# Patient Record
Sex: MALE | Race: BLACK OR AFRICAN AMERICAN | NOT HISPANIC OR LATINO
[De-identification: names, ages, dates, MRNs, and addresses within clinical notes are randomized per-mention and may not be internally consistent; named-entity substitution may affect disease eponyms.]

---

## 2017-03-23 ENCOUNTER — MEDICATION RENEWAL (OUTPATIENT)
Age: 67
End: 2017-03-23

## 2017-05-26 ENCOUNTER — APPOINTMENT (OUTPATIENT)
Dept: CARDIOLOGY | Facility: CLINIC | Age: 67
End: 2017-05-26

## 2017-05-26 ENCOUNTER — NON-APPOINTMENT (OUTPATIENT)
Age: 67
End: 2017-05-26

## 2017-05-26 VITALS
DIASTOLIC BLOOD PRESSURE: 77 MMHG | OXYGEN SATURATION: 99 % | BODY MASS INDEX: 28.51 KG/M2 | WEIGHT: 182 LBS | SYSTOLIC BLOOD PRESSURE: 140 MMHG | HEART RATE: 73 BPM

## 2017-05-26 VITALS — DIASTOLIC BLOOD PRESSURE: 72 MMHG | SYSTOLIC BLOOD PRESSURE: 122 MMHG

## 2017-05-26 DIAGNOSIS — N40.0 BENIGN PROSTATIC HYPERPLASIA WITHOUT LOWER URINARY TRACT SYMPMS: ICD-10-CM

## 2017-05-26 DIAGNOSIS — R07.89 OTHER CHEST PAIN: ICD-10-CM

## 2017-05-26 DIAGNOSIS — I73.9 PERIPHERAL VASCULAR DISEASE, UNSPECIFIED: ICD-10-CM

## 2017-09-28 ENCOUNTER — APPOINTMENT (OUTPATIENT)
Dept: CARDIOLOGY | Facility: CLINIC | Age: 67
End: 2017-09-28
Payer: COMMERCIAL

## 2017-09-28 PROCEDURE — 78452 HT MUSCLE IMAGE SPECT MULT: CPT

## 2017-09-28 PROCEDURE — A9500: CPT

## 2017-09-28 PROCEDURE — 93015 CV STRESS TEST SUPVJ I&R: CPT

## 2017-09-29 ENCOUNTER — APPOINTMENT (OUTPATIENT)
Dept: CARDIOLOGY | Facility: CLINIC | Age: 67
End: 2017-09-29
Payer: COMMERCIAL

## 2017-09-29 ENCOUNTER — MEDICATION RENEWAL (OUTPATIENT)
Age: 67
End: 2017-09-29

## 2017-09-29 PROCEDURE — 93306 TTE W/DOPPLER COMPLETE: CPT

## 2017-10-23 ENCOUNTER — MEDICATION RENEWAL (OUTPATIENT)
Age: 67
End: 2017-10-23

## 2017-11-20 ENCOUNTER — APPOINTMENT (OUTPATIENT)
Dept: ELECTROPHYSIOLOGY | Facility: CLINIC | Age: 67
End: 2017-11-20
Payer: COMMERCIAL

## 2017-11-20 ENCOUNTER — NON-APPOINTMENT (OUTPATIENT)
Age: 67
End: 2017-11-20

## 2017-11-20 VITALS
WEIGHT: 175 LBS | OXYGEN SATURATION: 98 % | DIASTOLIC BLOOD PRESSURE: 60 MMHG | HEART RATE: 69 BPM | HEIGHT: 67 IN | SYSTOLIC BLOOD PRESSURE: 102 MMHG | BODY MASS INDEX: 27.47 KG/M2

## 2017-11-20 VITALS — SYSTOLIC BLOOD PRESSURE: 108 MMHG | DIASTOLIC BLOOD PRESSURE: 56 MMHG

## 2017-11-20 PROCEDURE — 99244 OFF/OP CNSLTJ NEW/EST MOD 40: CPT

## 2017-11-20 PROCEDURE — 93000 ELECTROCARDIOGRAM COMPLETE: CPT

## 2018-01-05 ENCOUNTER — APPOINTMENT (OUTPATIENT)
Dept: CARDIOLOGY | Facility: CLINIC | Age: 68
End: 2018-01-05
Payer: COMMERCIAL

## 2018-01-05 ENCOUNTER — NON-APPOINTMENT (OUTPATIENT)
Age: 68
End: 2018-01-05

## 2018-01-05 VITALS
OXYGEN SATURATION: 98 % | DIASTOLIC BLOOD PRESSURE: 60 MMHG | HEART RATE: 42 BPM | SYSTOLIC BLOOD PRESSURE: 120 MMHG | BODY MASS INDEX: 28.51 KG/M2 | WEIGHT: 182 LBS

## 2018-01-05 VITALS — DIASTOLIC BLOOD PRESSURE: 60 MMHG | SYSTOLIC BLOOD PRESSURE: 120 MMHG | BODY MASS INDEX: 28.51 KG/M2 | WEIGHT: 182 LBS

## 2018-01-05 DIAGNOSIS — R63.4 ABNORMAL WEIGHT LOSS: ICD-10-CM

## 2018-01-05 DIAGNOSIS — M17.12 UNILATERAL PRIMARY OSTEOARTHRITIS, LEFT KNEE: ICD-10-CM

## 2018-01-05 PROCEDURE — 99214 OFFICE O/P EST MOD 30 MIN: CPT

## 2018-01-05 PROCEDURE — 93000 ELECTROCARDIOGRAM COMPLETE: CPT

## 2018-01-05 PROCEDURE — 93880 EXTRACRANIAL BILAT STUDY: CPT

## 2018-01-05 RX ORDER — SILODOSIN 8 MG/1
8 CAPSULE ORAL DAILY
Qty: 90 | Refills: 1 | Status: DISCONTINUED | COMMUNITY
End: 2018-01-05

## 2018-07-20 ENCOUNTER — NON-APPOINTMENT (OUTPATIENT)
Age: 68
End: 2018-07-20

## 2018-07-20 ENCOUNTER — APPOINTMENT (OUTPATIENT)
Dept: CARDIOLOGY | Facility: CLINIC | Age: 68
End: 2018-07-20
Payer: COMMERCIAL

## 2018-07-20 VITALS
SYSTOLIC BLOOD PRESSURE: 137 MMHG | DIASTOLIC BLOOD PRESSURE: 63 MMHG | HEIGHT: 67 IN | OXYGEN SATURATION: 100 % | HEART RATE: 81 BPM | WEIGHT: 175 LBS | BODY MASS INDEX: 27.47 KG/M2

## 2018-07-20 PROCEDURE — 93000 ELECTROCARDIOGRAM COMPLETE: CPT

## 2018-07-20 PROCEDURE — 99214 OFFICE O/P EST MOD 30 MIN: CPT

## 2018-09-20 ENCOUNTER — NON-APPOINTMENT (OUTPATIENT)
Age: 68
End: 2018-09-20

## 2018-09-20 ENCOUNTER — APPOINTMENT (OUTPATIENT)
Dept: CARDIOLOGY | Facility: CLINIC | Age: 68
End: 2018-09-20
Payer: COMMERCIAL

## 2018-09-20 VITALS
OXYGEN SATURATION: 97 % | BODY MASS INDEX: 28.25 KG/M2 | WEIGHT: 180 LBS | SYSTOLIC BLOOD PRESSURE: 129 MMHG | HEIGHT: 67 IN | DIASTOLIC BLOOD PRESSURE: 66 MMHG

## 2018-09-20 DIAGNOSIS — Z01.810 ENCOUNTER FOR PREPROCEDURAL CARDIOVASCULAR EXAMINATION: ICD-10-CM

## 2018-09-20 PROCEDURE — 99214 OFFICE O/P EST MOD 30 MIN: CPT | Mod: 25

## 2018-09-20 PROCEDURE — 93000 ELECTROCARDIOGRAM COMPLETE: CPT

## 2019-01-09 ENCOUNTER — RX RENEWAL (OUTPATIENT)
Age: 69
End: 2019-01-09

## 2019-01-31 ENCOUNTER — APPOINTMENT (OUTPATIENT)
Dept: CARDIOLOGY | Facility: CLINIC | Age: 69
End: 2019-01-31
Payer: COMMERCIAL

## 2019-01-31 PROCEDURE — 93306 TTE W/DOPPLER COMPLETE: CPT

## 2019-02-01 ENCOUNTER — APPOINTMENT (OUTPATIENT)
Dept: CARDIOLOGY | Facility: CLINIC | Age: 69
End: 2019-02-01
Payer: COMMERCIAL

## 2019-02-01 ENCOUNTER — NON-APPOINTMENT (OUTPATIENT)
Age: 69
End: 2019-02-01

## 2019-02-01 VITALS
DIASTOLIC BLOOD PRESSURE: 66 MMHG | OXYGEN SATURATION: 100 % | WEIGHT: 183 LBS | HEIGHT: 67 IN | BODY MASS INDEX: 28.72 KG/M2 | SYSTOLIC BLOOD PRESSURE: 133 MMHG | HEART RATE: 80 BPM

## 2019-02-01 VITALS — SYSTOLIC BLOOD PRESSURE: 130 MMHG | DIASTOLIC BLOOD PRESSURE: 78 MMHG

## 2019-02-01 DIAGNOSIS — R39.11 BENIGN PROSTATIC HYPERPLASIA WITH LOWER URINARY TRACT SYMPMS: ICD-10-CM

## 2019-02-01 DIAGNOSIS — N40.1 BENIGN PROSTATIC HYPERPLASIA WITH LOWER URINARY TRACT SYMPMS: ICD-10-CM

## 2019-02-01 PROCEDURE — 93000 ELECTROCARDIOGRAM COMPLETE: CPT

## 2019-02-01 PROCEDURE — 99215 OFFICE O/P EST HI 40 MIN: CPT

## 2019-02-05 NOTE — CARDIOLOGY SUMMARY
[___] : [unfilled] [LVEF ___%] : LVEF [unfilled]% [Severe] : severe LV dysfunction [Mild] : mild mitral regurgitation

## 2019-02-05 NOTE — PHYSICAL EXAM
[General Appearance - Well Developed] : well developed [Normal Appearance] : normal appearance [Well Groomed] : well groomed [General Appearance - Well Nourished] : well nourished [General Appearance - In No Acute Distress] : no acute distress [Normal Conjunctiva] : the conjunctiva exhibited no abnormalities [No Oral Pallor] : no oral pallor [No Oral Cyanosis] : no oral cyanosis [Normal Jugular Venous V Waves Present] : normal jugular venous V waves present [No Jugular Venous Frye A Waves] : no jugular venous frye A waves [Respiration, Rhythm And Depth] : normal respiratory rhythm and effort [Exaggerated Use Of Accessory Muscles For Inspiration] : no accessory muscle use [Auscultation Breath Sounds / Voice Sounds] : lungs were clear to auscultation bilaterally [Heart Rate And Rhythm] : heart rate and rhythm were normal [Heart Sounds] : normal S1 and S2 [Murmurs] : no murmurs present [Arterial Pulses Normal] : the arterial pulses were normal [Edema] : no peripheral edema present [Abdomen Soft] : soft [Abdomen Tenderness] : non-tender [Abnormal Walk] : normal gait [Nail Clubbing] : no clubbing of the fingernails [Cyanosis, Localized] : no localized cyanosis [Skin Turgor] : normal skin turgor [] : no rash [Oriented To Time, Place, And Person] : oriented to person, place, and time [Impaired Insight] : insight and judgment were intact [Affect] : the affect was normal [Memory Recent] : recent memory was not impaired [FreeTextEntry1] : left knee in a brace

## 2019-02-11 ENCOUNTER — OUTPATIENT (OUTPATIENT)
Dept: OUTPATIENT SERVICES | Facility: HOSPITAL | Age: 69
LOS: 1 days | Discharge: HOME | End: 2019-02-11
Payer: COMMERCIAL

## 2019-02-11 ENCOUNTER — TRANSCRIPTION ENCOUNTER (OUTPATIENT)
Age: 69
End: 2019-02-11

## 2019-02-11 VITALS
RESPIRATION RATE: 20 BRPM | SYSTOLIC BLOOD PRESSURE: 129 MMHG | TEMPERATURE: 98 F | OXYGEN SATURATION: 100 % | HEART RATE: 80 BPM | DIASTOLIC BLOOD PRESSURE: 75 MMHG

## 2019-02-11 VITALS
OXYGEN SATURATION: 97 % | RESPIRATION RATE: 15 BRPM | HEART RATE: 68 BPM | DIASTOLIC BLOOD PRESSURE: 77 MMHG | SYSTOLIC BLOOD PRESSURE: 148 MMHG

## 2019-02-11 DIAGNOSIS — I42.9 CARDIOMYOPATHY, UNSPECIFIED: ICD-10-CM

## 2019-02-11 DIAGNOSIS — Z78.9 OTHER SPECIFIED HEALTH STATUS: Chronic | ICD-10-CM

## 2019-02-11 LAB
HCT VFR BLD CALC: 45.4 % — SIGNIFICANT CHANGE UP (ref 42–52)
HGB BLD-MCNC: 15.1 G/DL — SIGNIFICANT CHANGE UP (ref 14–18)
MCHC RBC-ENTMCNC: 32.1 PG — HIGH (ref 27–31)
MCHC RBC-ENTMCNC: 33.3 G/DL — SIGNIFICANT CHANGE UP (ref 32–36)
MCV RBC AUTO: 96.6 FL — HIGH (ref 80–94)
PLATELET # BLD AUTO: 153 K/UL — SIGNIFICANT CHANGE UP (ref 150–400)
RBC # BLD: 4.7 M/UL — SIGNIFICANT CHANGE UP (ref 4.6–6.2)
RBC # FLD: 13 % — SIGNIFICANT CHANGE UP (ref 11–15.6)
WBC # BLD: 3.8 K/UL — LOW (ref 4.8–10.8)
WBC # FLD AUTO: 3.8 K/UL — LOW (ref 4.8–10.8)

## 2019-02-11 PROCEDURE — 76937 US GUIDE VASCULAR ACCESS: CPT

## 2019-02-11 PROCEDURE — 85730 THROMBOPLASTIN TIME PARTIAL: CPT

## 2019-02-11 PROCEDURE — 85027 COMPLETE CBC AUTOMATED: CPT

## 2019-02-11 PROCEDURE — 99152 MOD SED SAME PHYS/QHP 5/>YRS: CPT

## 2019-02-11 PROCEDURE — 76937 US GUIDE VASCULAR ACCESS: CPT | Mod: 26

## 2019-02-11 PROCEDURE — C1769: CPT

## 2019-02-11 PROCEDURE — C1889: CPT

## 2019-02-11 PROCEDURE — 93005 ELECTROCARDIOGRAM TRACING: CPT

## 2019-02-11 PROCEDURE — 85610 PROTHROMBIN TIME: CPT

## 2019-02-11 PROCEDURE — 84484 ASSAY OF TROPONIN QUANT: CPT

## 2019-02-11 PROCEDURE — 80048 BASIC METABOLIC PNL TOTAL CA: CPT

## 2019-02-11 PROCEDURE — 36415 COLL VENOUS BLD VENIPUNCTURE: CPT

## 2019-02-11 PROCEDURE — 93460 R&L HRT ART/VENTRICLE ANGIO: CPT | Mod: 26

## 2019-02-11 PROCEDURE — 99153 MOD SED SAME PHYS/QHP EA: CPT

## 2019-02-11 PROCEDURE — 93460 R&L HRT ART/VENTRICLE ANGIO: CPT

## 2019-02-11 PROCEDURE — 93010 ELECTROCARDIOGRAM REPORT: CPT

## 2019-02-11 PROCEDURE — C1894: CPT

## 2019-02-11 PROCEDURE — C1887: CPT

## 2019-02-11 RX ORDER — ASPIRIN/CALCIUM CARB/MAGNESIUM 324 MG
0 TABLET ORAL
Qty: 0 | Refills: 0 | COMMUNITY

## 2019-02-11 NOTE — H&P PST ADULT - ASSESSMENT
68 year old male with prior cardiac stent with recent echo revealed 20-25%.  For LHC to assess coronary arteries    Bleeding risk= 68 year old male with prior cardiac stent with recent echo revealed 20-25%.  For LHC to assess coronary arteries    Bleeding risk=0.8%

## 2019-02-11 NOTE — DISCHARGE NOTE ADULT - PATIENT PORTAL LINK FT
You can access the [facility] Patient Portal, offered by [facility], by registering with the following website: [email or web address]

## 2019-02-11 NOTE — H&P PST ADULT - HISTORY OF PRESENT ILLNESS
68 year old male 68 year old male with prior MI and cardiac stent who went for routine cardiology 68 year old male with prior MI and cardiac stent who went for routine cardiology follow up and was sent for echo which revealed decrease in EF to 20-25%

## 2019-02-11 NOTE — DISCHARGE NOTE ADULT - HOSPITAL COURSE
68 year old with recent drop in EF.  Now s/p LHC which revealed patent stent and normal coronaries.  Wedge 20.  Discharge to home.

## 2019-02-11 NOTE — DISCHARGE NOTE ADULT - CARE PROVIDER_API CALL
Charles Justin)  Nuclear Cardiology  [address] [facility], [address] [place] [address]  Phone: [phone]  Fax: [phone]  Follow Up Time:

## 2019-02-11 NOTE — DISCHARGE NOTE ADULT - CARE PLAN
Principal Discharge DX:	Cardiomyopathy  Goal:	optimal cardiac function  Assessment and plan of treatment:	No heavy lifting, driving, sex, tub baths, swimming, or any activity that submerges the lower half of the body in water for 48 hours.  Limited walking and stairs for 48 hours.    Change the bandaid after 24 hours and every 24 hours after that.  Keep the puncture site dry and covered with a bandaid until a scab forms.    Observe the site frequently.  If bleeding or a large lump (the size of a golf ball or bigger) occurs lie flat, apply continuous direct pressure just above the puncture site for at least 10 minutes, and notify your physician immediately.  If the bleeding cannot be controlled, call 911 immediately for assistance.  Notify your physician of pain, swelling or any drainage.    Notify your physician immediately if coldness, numbness, discoloration or pain in your foot occurs.  Restricted use with no heavy lifting of affected arm for 48 hours.  No submerging the arm in water for 48 hours.  You may start showering today.  Call your doctor for any bleeding, swelling, loss of sensation in the hand or fingers, or fingers turning blue.  If heavy bleeding or large lumps form, hold pressure at the spot and come to the Emergency Room.

## 2019-02-11 NOTE — H&P PST ADULT - PMH
Arthritis    BPH (benign prostatic hyperplasia)    CAD (coronary artery disease)    Cardiomyopathy    HTN (hypertension)    Hyperlipemia    MI, old    Nonischemic cardiomyopathy    PAD (peripheral artery disease)

## 2019-02-11 NOTE — DISCHARGE NOTE ADULT - PLAN OF CARE
optimal cardiac function No heavy lifting, driving, sex, tub baths, swimming, or any activity that submerges the lower half of the body in water for 48 hours.  Limited walking and stairs for 48 hours.    Change the bandaid after 24 hours and every 24 hours after that.  Keep the puncture site dry and covered with a bandaid until a scab forms.    Observe the site frequently.  If bleeding or a large lump (the size of a golf ball or bigger) occurs lie flat, apply continuous direct pressure just above the puncture site for at least 10 minutes, and notify your physician immediately.  If the bleeding cannot be controlled, call 911 immediately for assistance.  Notify your physician of pain, swelling or any drainage.    Notify your physician immediately if coldness, numbness, discoloration or pain in your foot occurs.  Restricted use with no heavy lifting of affected arm for 48 hours.  No submerging the arm in water for 48 hours.  You may start showering today.  Call your doctor for any bleeding, swelling, loss of sensation in the hand or fingers, or fingers turning blue.  If heavy bleeding or large lumps form, hold pressure at the spot and come to the Emergency Room.

## 2019-02-11 NOTE — DISCHARGE NOTE ADULT - MEDICATION SUMMARY - MEDICATIONS TO TAKE
I will START or STAY ON the medications listed below when I get home from the hospital:    aspirin 81 mg oral tablet, chewable  -- 1 tab(s) by mouth once a day  -- Indication: For Antiplatelet    tamsulosin 0.4 mg oral capsule  -- 1 cap(s) by mouth once a day  -- Indication: For bladder    atorvastatin 10 mg oral tablet  -- 1 tab(s) by mouth once a day   -- Indication: For hyperlipidemia    hydrochlorothiazide-valsartan 12.5 mg-320 mg oral tablet  -- 1 tab(s) by mouth once a day  -- Indication: For heart    valsartan-hydrochlorothiazide 320mg-12.5mg oral tablet  -- 1 tab(s) by mouth once a day  -- Indication: For heart    clopidogrel 75 mg oral tablet  -- 1 tab(s) by mouth once a day  -- Indication: For Antiplatelet    Coreg CR 10 mg oral capsule, extended release  -- 1 cap(s) by mouth once a day (in the morning)  -- Indication: For heart    oxybutynin 15 mg/24 hr oral tablet, extended release  -- 1 tab(s) by mouth once a day  -- Indication: For bladder    Vitamin D3 2000 intl units oral capsule  -- 1 cap(s) by mouth once a day  -- Indication: For supplement

## 2019-02-14 DIAGNOSIS — R94.39 ABNORMAL RESULT OF OTHER CARDIOVASCULAR FUNCTION STUDY: ICD-10-CM

## 2019-02-15 ENCOUNTER — OTHER (OUTPATIENT)
Age: 69
End: 2019-02-15

## 2019-02-20 PROBLEM — M19.90 UNSPECIFIED OSTEOARTHRITIS, UNSPECIFIED SITE: Chronic | Status: ACTIVE | Noted: 2019-02-11

## 2019-02-20 PROBLEM — I42.9 CARDIOMYOPATHY, UNSPECIFIED: Chronic | Status: ACTIVE | Noted: 2019-02-11

## 2019-04-17 ENCOUNTER — MEDICATION RENEWAL (OUTPATIENT)
Age: 69
End: 2019-04-17

## 2019-05-10 ENCOUNTER — APPOINTMENT (OUTPATIENT)
Dept: CARDIOLOGY | Facility: CLINIC | Age: 69
End: 2019-05-10
Payer: COMMERCIAL

## 2019-05-10 PROCEDURE — 93306 TTE W/DOPPLER COMPLETE: CPT

## 2019-05-17 ENCOUNTER — MESSAGE (OUTPATIENT)
Age: 69
End: 2019-05-17

## 2019-05-30 ENCOUNTER — APPOINTMENT (OUTPATIENT)
Dept: CARDIOLOGY | Facility: CLINIC | Age: 69
End: 2019-05-30
Payer: COMMERCIAL

## 2019-05-30 VITALS — HEART RATE: 72 BPM

## 2019-05-30 VITALS
HEIGHT: 67 IN | BODY MASS INDEX: 28.72 KG/M2 | HEART RATE: 38 BPM | SYSTOLIC BLOOD PRESSURE: 124 MMHG | WEIGHT: 183 LBS | DIASTOLIC BLOOD PRESSURE: 63 MMHG | OXYGEN SATURATION: 100 %

## 2019-05-30 PROCEDURE — 93000 ELECTROCARDIOGRAM COMPLETE: CPT

## 2019-05-30 PROCEDURE — 99214 OFFICE O/P EST MOD 30 MIN: CPT | Mod: 25

## 2019-06-11 RX ORDER — OXYBUTYNIN CHLORIDE 15 MG/1
15 TABLET, EXTENDED RELEASE ORAL
Refills: 0 | Status: COMPLETED | COMMUNITY
Start: 2018-12-13 | End: 2019-06-11

## 2019-06-25 ENCOUNTER — APPOINTMENT (OUTPATIENT)
Dept: CARDIOLOGY | Facility: CLINIC | Age: 69
End: 2019-06-25
Payer: COMMERCIAL

## 2019-06-25 VITALS — SYSTOLIC BLOOD PRESSURE: 138 MMHG | DIASTOLIC BLOOD PRESSURE: 70 MMHG | HEART RATE: 70 BPM

## 2019-06-25 VITALS
BODY MASS INDEX: 27.78 KG/M2 | WEIGHT: 177 LBS | RESPIRATION RATE: 16 BRPM | DIASTOLIC BLOOD PRESSURE: 87 MMHG | HEART RATE: 60 BPM | SYSTOLIC BLOOD PRESSURE: 162 MMHG | HEIGHT: 67 IN | OXYGEN SATURATION: 98 %

## 2019-06-25 VITALS — DIASTOLIC BLOOD PRESSURE: 80 MMHG | SYSTOLIC BLOOD PRESSURE: 146 MMHG

## 2019-06-25 PROCEDURE — 99214 OFFICE O/P EST MOD 30 MIN: CPT

## 2019-09-03 ENCOUNTER — MEDICATION RENEWAL (OUTPATIENT)
Age: 69
End: 2019-09-03

## 2019-09-03 RX ORDER — ASPIRIN 81 MG
81 TABLET, DELAYED RELEASE (ENTERIC COATED) ORAL DAILY
Qty: 90 | Refills: 3 | Status: DISCONTINUED | COMMUNITY
End: 2019-09-03

## 2019-10-18 ENCOUNTER — APPOINTMENT (OUTPATIENT)
Dept: CARDIOLOGY | Facility: CLINIC | Age: 69
End: 2019-10-18
Payer: COMMERCIAL

## 2019-10-18 ENCOUNTER — NON-APPOINTMENT (OUTPATIENT)
Age: 69
End: 2019-10-18

## 2019-10-18 VITALS
RESPIRATION RATE: 12 BRPM | HEIGHT: 66 IN | OXYGEN SATURATION: 100 % | BODY MASS INDEX: 28.12 KG/M2 | DIASTOLIC BLOOD PRESSURE: 70 MMHG | HEART RATE: 64 BPM | WEIGHT: 175 LBS | SYSTOLIC BLOOD PRESSURE: 124 MMHG

## 2019-10-18 VITALS — SYSTOLIC BLOOD PRESSURE: 138 MMHG | DIASTOLIC BLOOD PRESSURE: 78 MMHG

## 2019-10-18 PROCEDURE — 93306 TTE W/DOPPLER COMPLETE: CPT

## 2019-10-18 PROCEDURE — 93000 ELECTROCARDIOGRAM COMPLETE: CPT

## 2019-10-18 PROCEDURE — 99214 OFFICE O/P EST MOD 30 MIN: CPT

## 2019-10-18 NOTE — CARDIOLOGY SUMMARY
[___] : [unfilled] [LVEF ___%] : LVEF [unfilled]% [Severe] : severe LV dysfunction [None] : no pulmonary hypertension [Enlarged] : enlarged LA size

## 2019-10-22 ENCOUNTER — APPOINTMENT (OUTPATIENT)
Dept: ELECTROPHYSIOLOGY | Facility: CLINIC | Age: 69
End: 2019-10-22

## 2019-10-22 NOTE — DISCUSSION/SUMMARY
[FreeTextEntry1] : In summary, this is a 69 year old male with hyperlipidemia, peripheral arterial disease (s/p stents), hypertension and coronary artery disease (with MI s/p PCI) with cardiomyopathy (suspected mixed ischemic and non-ischemic) and NYHA __ HFrEF (LVEF: 19% 10/18/19) who is referred by Dr. Justin in consultation for consideration of cardiac resynchornization therapy.\par \par RBBB with QRS dur of ~150 msec. IIa indiciation for CRT-D. ?consider His.\par \par INCOMPLETE NOTE - PATIENT DID NOT SHOW FOR VISIT.\par

## 2019-10-22 NOTE — HISTORY OF PRESENT ILLNESS
[FreeTextEntry1] : Sanju Owens is a 69 year old male with hyperlipidemia, peripheral arterial disease (s/p stents), hypertension and coronary artery disease (with MI s/p PCI) with cardiomyopathy (suspected mixed ischemic and non-ischemic) and NYHA __ HFrEF (LVEF: 19% 10/18/19) who is referred by Dr. Justin in consultation for consideration of cardiac resynchornization therapy.\par \par To summarize his history, he had an inferior wall MI in 2005 status post PCI and brief shock requiring IABP support. His LVEF was initially <20% but improved up to 40-45% in 11/2017. Repeat TTE on 1/31/19 demonstrated a newly depressed LVEF of 20-25%. This drop in LVEF occurred in the setting of increase in alcohol intake. HIs medical regimen was intensified including increasing Carvedilol and changing to Entresto. Despite intensification of his medical regimen and cessation of alcohol, his LVEF has continued to depress.\par \par INCOMPLETE NOTE\par Today, __.

## 2019-11-05 ENCOUNTER — NON-APPOINTMENT (OUTPATIENT)
Age: 69
End: 2019-11-05

## 2019-11-05 ENCOUNTER — APPOINTMENT (OUTPATIENT)
Dept: ELECTROPHYSIOLOGY | Facility: CLINIC | Age: 69
End: 2019-11-05
Payer: COMMERCIAL

## 2019-11-05 VITALS
DIASTOLIC BLOOD PRESSURE: 80 MMHG | HEART RATE: 96 BPM | WEIGHT: 175 LBS | SYSTOLIC BLOOD PRESSURE: 142 MMHG | OXYGEN SATURATION: 100 % | BODY MASS INDEX: 28.12 KG/M2 | HEIGHT: 66 IN

## 2019-11-05 PROCEDURE — 99205 OFFICE O/P NEW HI 60 MIN: CPT | Mod: 25

## 2019-11-05 PROCEDURE — 93000 ELECTROCARDIOGRAM COMPLETE: CPT

## 2019-11-05 NOTE — PHYSICAL EXAM
[General Appearance - Well Developed] : well developed [Normal Appearance] : normal appearance [General Appearance - Well Nourished] : well nourished [Normal Conjunctiva] : the conjunctiva exhibited no abnormalities [Eyelids - No Xanthelasma] : the eyelids demonstrated no xanthelasmas [Normal Oral Mucosa] : normal oral mucosa [Normal Oropharynx] : normal oropharynx [Normal Jugular Venous V Waves Present] : normal jugular venous V waves present [Heart Rate And Rhythm] : heart rate and rhythm were normal [Heart Sounds] : normal S1 and S2 [Murmurs] : no murmurs present [Bowel Sounds] : normal bowel sounds [Abdomen Soft] : soft [Abdomen Tenderness] : non-tender [Abnormal Walk] : normal gait [Nail Clubbing] : no clubbing of the fingernails [Cyanosis, Localized] : no localized cyanosis [Skin Color & Pigmentation] : normal skin color and pigmentation [Skin Turgor] : normal skin turgor [] : no rash [Oriented To Time, Place, And Person] : oriented to person, place, and time [Impaired Insight] : insight and judgment were intact [No Anxiety] : not feeling anxious

## 2019-11-05 NOTE — HISTORY OF PRESENT ILLNESS
[FreeTextEntry1] : Sanju Owens is a 69 year old male with hyperlipidemia, peripheral arterial disease (s/p stents), hypertension and coronary artery disease (with MI s/p PCI) with cardiomyopathy (suspected mixed ischemic and non-ischemic) and NYHA IIC HFrEF (LVEF: 19% 10/18/19) who is referred by Dr. Justin in consultation for consideration of cardiac resynchronization therapy.\par \par To summarize his history, he had an inferior wall MI in 2005 status post PCI and brief shock requiring IABP support. His LVEF was initially <20% but improved up to 40-45% in 11/2017. Repeat TTE on 1/31/19 demonstrated a newly depressed LVEF of 20-25%. This drop in LVEF occurred in the setting of increase in alcohol intake. HIs medical regimen was intensified including increasing Carvedilol and changing to Entresto. Despite intensification of his medical regimen and cessation of alcohol, his LVEF has continued to depress.\par \par Today, the patient states that at times he notes that  he feels his heart "flips." He works at [employer] and is able to walk on flat surfaces without issue. He does notice that he develops dyspnea after walking up one flight of stairs. He does not if he walks too fast then he notices that he feels "vibration" in his heart and also dyspnea. He notes that he does get lightheaded if he lifts his head up too fast from bending down.\par \par He denies any chest pain, orthopnea, paroxysmal nocturnal dyspnea, peripheral edema, syncope, nausea, vomiting or diaphoresis.

## 2019-11-05 NOTE — DISCUSSION/SUMMARY
[FreeTextEntry1] : In summary, this is a 69 year old male with hyperlipidemia, peripheral arterial disease (s/p stents), hypertension and coronary artery disease (with MI s/p PCI) with cardiomyopathy (suspected mixed ischemic and non-ischemic) and NYHA IIC HFrEF (LVEF: 19% 10/18/19) who is referred by Dr. Justin in consultation for consideration of cardiac resynchronization therapy.\par \par Today he demonstrates frequent APCs. It is possible that his frequent APCs could be contributing to his myopathy. We will obtain a 48 hour Holter monitor to determine his APC burden. In the mean time we will increase his Carvedilol ER from 20mg to 40mg daily.\par \par In regards to cardiac resynchronization, he has NYHA II symptoms with a wide RBBB of about 150 msec, which is a IIb indication for cardiac resynchronization. It is possible that he may respond to His bundle resynchronization more than biventricular pacing. He also meets indication for primary prevention ICD as he has had a persistently low EF despite over 3 months of guideline directed medical therapy.\par \par If increase in Carvedilol is ineffective, then we will pursue cardiac resynchronization.\par \par Recommendations:\par - Increase Carvedilol ER to 40mg daily\par - 48 Hour Holter in 1 week\par - RTC in 3 weeks

## 2019-11-21 NOTE — PROGRESS NOTE ADULT - SUBJECTIVE AND OBJECTIVE BOX
I have examined the patient . I have explained risk and benefits . I have attained consent. I agree with a cardiac catheterization.
Nurse Practitioner Progress note:     INTERVAL HISTORY: 68 year old with decrease in EF.      TELEMETRY: SR/SB 40-80    T(C): 36.4 (02-11-19 @ 10:15), Max: 36.4 (02-11-19 @ 10:15)  HR: 55 (02-11-19 @ 13:19) (55 - 80)  BP: 123/61 (02-11-19 @ 13:19) (113/67 - 129/75)  RR: 15 (02-11-19 @ 13:19) (15 - 20)  SpO2: 100% (02-11-19 @ 13:19) (96% - 100%)  Wt(kg): --    PHYSICAL EXAM:  Appearance: Normal	  Cardiovascular: Normal S1 S2, No JVD, No murmurs, No edema  Respiratory: Lungs clear to auscultation	  Psychiatry: A & O x 3, Mood & affect appropriate  Neurologic: Non-focal, A&O X3.  No neuro deficits  Procedure Site: Right radial band in place.  Right groin sheath in place.  Sites benign.  No bleeding/hematoma/ecchymosis.  + palp pedal and radial pulse    PROCEDURE RESULTS: S/P LHC/RHC which revealed patent stent and normal coronaries.  Wedge 20    ASSESSMENT/PLAN: 	  -Groin precautions  -D/C sheath  -Radial precautions  -D/C radial band  -Bedrest   -Resume home meds  -Follow up with Dr. Rivera  -Discharge to home when criteria met
No

## 2019-11-22 ENCOUNTER — APPOINTMENT (OUTPATIENT)
Dept: CARDIOLOGY | Facility: CLINIC | Age: 69
End: 2019-11-22

## 2019-12-03 ENCOUNTER — APPOINTMENT (OUTPATIENT)
Dept: ELECTROPHYSIOLOGY | Facility: CLINIC | Age: 69
End: 2019-12-03
Payer: COMMERCIAL

## 2019-12-03 VITALS
BODY MASS INDEX: 28.61 KG/M2 | OXYGEN SATURATION: 97 % | HEIGHT: 66 IN | HEART RATE: 77 BPM | WEIGHT: 178 LBS | SYSTOLIC BLOOD PRESSURE: 132 MMHG | DIASTOLIC BLOOD PRESSURE: 78 MMHG

## 2019-12-03 PROCEDURE — 93000 ELECTROCARDIOGRAM COMPLETE: CPT

## 2019-12-03 PROCEDURE — 99215 OFFICE O/P EST HI 40 MIN: CPT | Mod: 25

## 2019-12-03 NOTE — PHYSICAL EXAM
[General Appearance - Well Developed] : well developed [Normal Appearance] : normal appearance [Well Groomed] : well groomed [General Appearance - Well Nourished] : well nourished [No Deformities] : no deformities [General Appearance - In No Acute Distress] : no acute distress [Respiration, Rhythm And Depth] : normal respiratory rhythm and effort [Exaggerated Use Of Accessory Muscles For Inspiration] : no accessory muscle use [Auscultation Breath Sounds / Voice Sounds] : lungs were clear to auscultation bilaterally [Heart Rate And Rhythm] : heart rate and rhythm were normal [Heart Sounds] : normal S1 and S2 [Murmurs] : no murmurs present [Abdomen Soft] : soft [Abdomen Tenderness] : non-tender [Abdomen Mass (___ Cm)] : no abdominal mass palpated [Abnormal Walk] : normal gait [Gait - Sufficient For Exercise Testing] : the gait was sufficient for exercise testing [Nail Clubbing] : no clubbing of the fingernails [Cyanosis, Localized] : no localized cyanosis [Petechial Hemorrhages (___cm)] : no petechial hemorrhages [Skin Color & Pigmentation] : normal skin color and pigmentation [] : no rash [Skin Lesions] : no skin lesions [No Venous Stasis] : no venous stasis [No Skin Ulcers] : no skin ulcer [No Xanthoma] : no  xanthoma was observed [Oriented To Time, Place, And Person] : oriented to person, place, and time [Affect] : the affect was normal [Mood] : the mood was normal [No Anxiety] : not feeling anxious

## 2019-12-09 NOTE — HISTORY OF PRESENT ILLNESS
[FreeTextEntry1] :  69 year old male with hyperlipidemia, peripheral arterial disease (s/p stents), hypertension and coronary artery disease (with MI s/p PCI) with cardiomyopathy (suspected mixed ischemic and non-ischemic) and NYHA IIC HFrEF (LVEF: 19% 10/18/19) who was initially referred by Dr. Justin in consultation for consideration of cardiac resynchronization therapy.  At initial consult EKG demonstrated frequent APCs, Carvedilol was increased and patient was placed on a 48 hour Holter monitor to assess burden. \par \par Holter Monitoring 11/14/19 - 11/16/19 with SVE total 30.7%.  \par EKG today with atrial bigeminy (APCs all appear similar morphology - left inferior axis, V1 terminal negative and late transition; very similar to sinus beats).\par

## 2019-12-09 NOTE — END OF VISIT
[FreeTextEntry3] : I have personally seen, examined, and participated in the care of this patient. I have reviewed all pertinent clinical information, including history, physical exam, plan, and the PA/NP's note and agree except as noted below.\par \par Briefly, this is a 69 year old male with hyperlipidemia, peripheral arterial disease (s/p stents), hypertension and coronary artery disease (with MI s/p PCI) with cardiomyopathy (suspected mixed ischemic and non-ischemic) and NYHA IIC HFrEF (LVEF: 19% 10/18/19) who was initially referred by Dr. Justin in consultation for consideration of cardiac resynchronization therapy. He has been noted to have frequent monomorphic APCs in a bigeminy pattern with Holter monitoring demonstrating a 30% APC burden. There have been case reports demonstrating that frequent APCs can contribute to cardiomyopathy and radiofrequency ablation of APCs can result in restoration of normal cardiac function. The patient has expressed a desire to avoid any cardiac implants if possible. As such, we discussed the role of catheter ablation of APCs for potentially restoring normal rhythm. Though the incidence of APC associated cardiomyopathy is low, it is the best option he has to avoid device implant and potentially restore normal cardiac function. Moreover, though his QRS is wide, it is of a RBBB morphology and statistically these patients do not benefit as much with cardiac resynchronization.\par \par I called the patient to discuss these options and after a thorough discussion, the patient would like to proceed with catheter ablation of APCs, which are likely arising from close to the sinus node (given very similar morphology to his sinus beats). The most significant risk associated with this kind of ablation is that of injury to the phrenic nerve. We may have to consider using cryoablation and CMAPs to avoid damage to the phrenic if it is in close proximity to the ablation site.\par \par Plan:\par - Catheter ablation of likely brianna APC\par - Continue GDMT for HFrEF\par \par Calvin Cee MD\par Clinical Cardiac Electrophysiology

## 2019-12-09 NOTE — DISCUSSION/SUMMARY
[FreeTextEntry1] : In summary, this is a 69 year old male with hyperlipidemia, peripheral arterial disease (s/p stents), hypertension and coronary artery disease (with MI s/p PCI) with cardiomyopathy (suspected mixed ischemic and non-ischemic) and NYHA IIC HFrEF (LVEF: 19% 10/18/19) who was initially referred by Dr. Justin in consultation for consideration of cardiac resynchronization therapy.\par \par Holter monitoring has demonstrated frequent APCs which appear monomorphic. [place] 30%. He has increased his Carvedilol dosing and is tolerating it well. \par \par We discussed the option of potential ablation of frequent APCs, which may be contributing to his decrease in LVEF, vs pursing cardiac resynchronization ICD.  \par \par Dr. Cee educated the patient on both procedures, including the risk and benefits. He will review the case with his peers and contact the patient with definitive plan next week. \par \par Shruti BUSTOS-C

## 2019-12-27 ENCOUNTER — OUTPATIENT (OUTPATIENT)
Dept: OUTPATIENT SERVICES | Facility: HOSPITAL | Age: 69
LOS: 1 days | End: 2019-12-27
Payer: COMMERCIAL

## 2019-12-27 VITALS
OXYGEN SATURATION: 100 % | WEIGHT: 182.98 LBS | SYSTOLIC BLOOD PRESSURE: 151 MMHG | RESPIRATION RATE: 16 BRPM | TEMPERATURE: 98 F | DIASTOLIC BLOOD PRESSURE: 85 MMHG | HEIGHT: 65 IN

## 2019-12-27 VITALS
RESPIRATION RATE: 16 BRPM | OXYGEN SATURATION: 100 % | TEMPERATURE: 98 F | HEART RATE: 74 BPM | HEIGHT: 65 IN | WEIGHT: 182.1 LBS | DIASTOLIC BLOOD PRESSURE: 81 MMHG | SYSTOLIC BLOOD PRESSURE: 153 MMHG

## 2019-12-27 DIAGNOSIS — Z01.818 ENCOUNTER FOR OTHER PREPROCEDURAL EXAMINATION: ICD-10-CM

## 2019-12-27 DIAGNOSIS — Z78.9 OTHER SPECIFIED HEALTH STATUS: Chronic | ICD-10-CM

## 2019-12-27 LAB
ANION GAP SERPL CALC-SCNC: 11 MMOL/L — SIGNIFICANT CHANGE UP (ref 5–17)
APTT BLD: 31.3 SEC — SIGNIFICANT CHANGE UP (ref 27.5–36.3)
BLD GP AB SCN SERPL QL: SIGNIFICANT CHANGE UP
BUN SERPL-MCNC: 12 MG/DL — SIGNIFICANT CHANGE UP (ref 8–20)
CALCIUM SERPL-MCNC: 8.8 MG/DL — SIGNIFICANT CHANGE UP (ref 8.6–10.2)
CHLORIDE SERPL-SCNC: 106 MMOL/L — SIGNIFICANT CHANGE UP (ref 98–107)
CO2 SERPL-SCNC: 25 MMOL/L — SIGNIFICANT CHANGE UP (ref 22–29)
CREAT SERPL-MCNC: 1.35 MG/DL — HIGH (ref 0.5–1.3)
GLUCOSE SERPL-MCNC: 98 MG/DL — SIGNIFICANT CHANGE UP (ref 70–115)
HCT VFR BLD CALC: 43.8 % — SIGNIFICANT CHANGE UP (ref 39–50)
HGB BLD-MCNC: 14.1 G/DL — SIGNIFICANT CHANGE UP (ref 13–17)
INR BLD: 1.1 RATIO — SIGNIFICANT CHANGE UP (ref 0.88–1.16)
MAGNESIUM SERPL-MCNC: 2 MG/DL — SIGNIFICANT CHANGE UP (ref 1.8–2.6)
MCHC RBC-ENTMCNC: 30.9 PG — SIGNIFICANT CHANGE UP (ref 27–34)
MCHC RBC-ENTMCNC: 32.2 GM/DL — SIGNIFICANT CHANGE UP (ref 32–36)
MCV RBC AUTO: 95.8 FL — SIGNIFICANT CHANGE UP (ref 80–100)
PLATELET # BLD AUTO: 121 K/UL — LOW (ref 150–400)
POTASSIUM SERPL-MCNC: 5 MMOL/L — SIGNIFICANT CHANGE UP (ref 3.5–5.3)
POTASSIUM SERPL-SCNC: 5 MMOL/L — SIGNIFICANT CHANGE UP (ref 3.5–5.3)
PROTHROM AB SERPL-ACNC: 12.7 SEC — SIGNIFICANT CHANGE UP (ref 10–12.9)
RBC # BLD: 4.57 M/UL — SIGNIFICANT CHANGE UP (ref 4.2–5.8)
RBC # FLD: 13.8 % — SIGNIFICANT CHANGE UP (ref 10.3–14.5)
SODIUM SERPL-SCNC: 142 MMOL/L — SIGNIFICANT CHANGE UP (ref 135–145)
WBC # BLD: 3.95 K/UL — SIGNIFICANT CHANGE UP (ref 3.8–10.5)
WBC # FLD AUTO: 3.95 K/UL — SIGNIFICANT CHANGE UP (ref 3.8–10.5)

## 2019-12-27 PROCEDURE — 93010 ELECTROCARDIOGRAM REPORT: CPT

## 2019-12-27 PROCEDURE — G0463: CPT

## 2019-12-27 PROCEDURE — 93005 ELECTROCARDIOGRAM TRACING: CPT

## 2019-12-27 NOTE — H&P PST ADULT - NSICDXPASTMEDICALHX_GEN_ALL_CORE_FT
PAST MEDICAL HISTORY:  Arthritis     BPH (benign prostatic hyperplasia)     CAD (coronary artery disease) MI, PCI    Cardiomyopathy     HTN (hypertension)     Hyperlipemia     MI, old     Nonischemic cardiomyopathy     PAD (peripheral artery disease) s/p stents PAST MEDICAL HISTORY:  Arthritis     BPH (benign prostatic hyperplasia)     CAD (coronary artery disease) MI, PCI 2000    Cardiomyopathy     HTN (hypertension)     Hyperlipemia     MI, old     Nonischemic cardiomyopathy     PAD (peripheral artery disease) s/p stents PAST MEDICAL HISTORY:  Arthritis     BPH (benign prostatic hyperplasia)     CAD (coronary artery disease) MI, PCI 2000    Cardiomyopathy     DM (diabetes mellitus)     HTN (hypertension)     Hyperlipemia     MI, old     Nonischemic cardiomyopathy     PAD (peripheral artery disease) s/p stents

## 2019-12-27 NOTE — ASU PATIENT PROFILE, ADULT - TEACHING/LEARNING LEARNING PREFERENCES
skill demonstration/group instruction/individual instruction/video/audio/computer/internet/written material/pictorial/verbal instruction

## 2019-12-27 NOTE — H&P PST ADULT - ATTENDING COMMENTS
I have personally seen, examined, and participated in the care of this patient. I have reviewed all pertinent clinical information, including history, physical exam, plan, and the PA/NP's note and agree except as noted below.    Briefly, 69 year old male with BPH, NIDDM, HTN, hyperlipidemia, PAD s/p stents, CAD with inferior MI (2005 s/p PCI), HFrEF (previously <20% --> 40-45% but then again dropped again to <20% despite GDMT) who was noted to have frequent PACs which was felt may be contributing to his cardiomyopathy. Prior to proceeding with ICD implant (and possible CRT as patient has a wide RBBB of 142 msec), after thorough discussion with the patient about risks, benefits and alternatives, the decision was made to pursue catheter ablation of his frequent monomorphic PACs which appear to be very similar to sinus beats suggestive of brianna source.    There has been no significant change to patient's status since PST.    Calvin Cee MD  Clinical Cardiac Electrophysiology I have personally seen, examined, and participated in the care of this patient. I have reviewed all pertinent clinical information, including history, physical exam, plan, and the PA/NP's note and agree except as noted below.    Briefly, 69 year old male with BPH, NIDDM, HTN, hyperlipidemia, PAD s/p stents, CAD with inferior MI (2005 s/p PCI), chronic systolic HFrEF (previously <20% --> 40-45% but then again dropped again to <20% despite >3 months of GDMT) who was noted to have frequent PACs which was felt may be contributing to his cardiomyopathy. Prior to proceeding with ICD implant (and possible CRT as patient has a wide RBBB of 142 msec), after thorough discussion with the patient about risks, benefits and alternatives, the decision was made to pursue catheter ablation of his frequent monomorphic PACs which appear to be very similar to sinus beats suggestive of brianna source.    There has been no significant change to patient's status since PST.    Calvin Cee MD  Clinical Cardiac Electrophysiology

## 2019-12-27 NOTE — ASU PATIENT PROFILE, ADULT - NS PRO TALK SOMEONE YN
Quality 110: Preventive Care And Screening: Influenza Immunization: Influenza Immunization previously received during influenza season Quality 226: Preventive Care And Screening: Tobacco Use: Screening And Cessation Intervention: Patient screened for tobacco and never smoked Quality 130: Documentation Of Current Medications In The Medical Record: Current Medications Documented Quality 47: Advance Care Plan: Advance Care Planning discussed and documented in the medical record; patient did not wish or was not able to name a surrogate decision maker or provide an advance care plan. Quality 111:Pneumonia Vaccination Status For Older Adults: Pneumococcal Vaccination not Administered or Previously Received, Reason not Otherwise Specified Detail Level: Detailed Quality 131: Pain Assessment And Follow-Up: Pain assessment using a standardized tool is documented as negative, no follow-up plan required no

## 2019-12-27 NOTE — H&P PST ADULT - HISTORY OF PRESENT ILLNESS
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 Justin in consultation for consideration of cardiac resynchronization therapy.    To summarize his history, he had an inferior wall MI in 2005 status post PCI and brief shock requiring IABP support. His LVEF was initially <20% but improved up to 40-45% in 11/2017. Repeat TTE on 1/31/19 demonstrated a newly depressed LVEF of 20-25%. This drop in LVEF occurred in the setting of increase in alcohol intake. HIs medical regimen was intensified including increasing Carvedilol and changing to Entresto. Despite intensification of his medical regimen and cessation of alcohol, his LVEF has continued to depress.    Today, the patient states that at times he notes that he feels his heart "flips." He works at [employer] and is able to walk on flat surfaces without issue. He does notice that he develops dyspnea after walking up one flight of stairs. He does not if he walks too fast then he notices that he feels "vibration" in his heart and also dyspnea. He notes that he does get lightheaded if he lifts his head up too fast from bending down.    He denies any chest pain, orthopnea, paroxysmal nocturnal dyspnea, peripheral edema, syncope, nausea, vomiting or diaphoresis.      Cardiology Summary  Holter Monitoring 11/14/19 - 11/16/19 with SVE total 30.7%.   Echo: 10/18/19,  (Severely decreased LV function LVEF <20% with mid anterolateral wall, basal and mid inferior wall, basal and mid inferolateral wall akinesis. Severely reduced RV systolic function. Moderate to severe LA enlargement. Moderately dilated RA. 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    Cardio summary: inferior wall MI in 2005 status post PCI and brief shock requiring IABP support. His LVEF was initially <20% but improved up to 40-45% in 11/2017. Repeat TTE on 1/31/19 demonstrated a newly depressed LVEF of 20-25%. This drop in LVEF occurred in the setting of increase in alcohol intake. HIs medical regimen was intensified including increasing Carvedilol and changing to Entresto. Despite intensification of his medical regimen and cessation of alcohol, his LVEF has continued to depress.    Holter Monitoring 11/14/19 - 11/16/19 with SVE total 30.7%.   Echo: 10/18/19,  (Severely decreased LV function LVEF <20% with mid anterolateral wall, basal and mid inferior wall, basal and mid inferolateral wall akinesis. Severely reduced RV systolic function. Moderate to severe LA enlargement. Moderately dilated RA. Mild-moderate TR)   Cardiac Cath 2/11/19: LM-nml, mLAD 20%, mCx 10% in-stent stenosis, OM1-20% stenosis, RCA-nml 69 year old male with BPH, HTN, hyperlipidemia, peripheral arterial disease (s/p stents), CAD/MI/PCI ( cardiomyopathy (suspected mixed ischemic and non-ischemic) and HFrEF (LVEF: 19% 10/18/19) who presents for PST for elective APC ablation.    Cardio summary: inferior wall MI in 2005 status post PCI and brief shock requiring IABP support. His LVEF was initially <20% but improved up to 40-45% in 11/2017. Repeat TTE on 1/31/19 demonstrated a newly depressed LVEF of 20-25%. This drop in LVEF occurred in the setting of increase in alcohol intake. HIs medical regimen was intensified including increasing Carvedilol and changing to Entresto. Despite intensification of his medical regimen and cessation of alcohol, his LVEF has continued to depress.    Holter Monitoring 11/14/19 - 11/16/19 with SVE total 30.7%.   Echo: 10/18/19,  (Severely decreased LV function LVEF <20% with mid anterolateral wall, basal and mid inferior wall, basal and mid inferolateral wall akinesis. Severely reduced RV systolic function. Moderate to severe LA enlargement. Moderately dilated RA. Mild-moderate TR)   Cardiac Cath 2/11/19: LM-nml, mLAD 20%, mCx 10% in-stent stenosis, OM1-20% stenosis, RCA-nml 69 year old male with BPH, NIDDM, HTN, hyperlipidemia, peripheral arterial disease (s/p stents), CAD/MI/PCI (cardiomyopathy (suspected mixed ischemic and non-ischemic) and HFrEF (LVEF: 19% 10/18/19) who presents for PST for elective APC ablation.    Cardio summary: inferior wall MI in 2005 status post PCI and brief shock requiring IABP support. His LVEF was initially <20% but improved up to 40-45% in 11/2017. Repeat TTE on 1/31/19 demonstrated a newly depressed LVEF of 20-25%. This drop in LVEF occurred in the setting of increase in alcohol intake. HIs medical regimen was intensified including increasing Carvedilol and changing to Entresto. Despite intensification of his medical regimen and cessation of alcohol, his LVEF has continued to depress.    Holter Monitoring 11/14/19 - 11/16/19 with SVE total 30.7%.   Echo: 10/18/19,  (Severely decreased LV function LVEF <20% with mid anterolateral wall, basal and mid inferior wall, basal and mid inferolateral wall akinesis. Severely reduced RV systolic function. Moderate to severe LA enlargement. Moderately dilated RA. Mild-moderate TR)   Cardiac Cath 2/11/19: LM-nml, mLAD 20%, mCx 10% in-stent stenosis, OM1-20% stenosis, RCA-nml

## 2019-12-27 NOTE — ASU PATIENT PROFILE, ADULT - PMH
Arthritis    BPH (benign prostatic hyperplasia)    CAD (coronary artery disease)  MI, PCI 2000  Cardiomyopathy    HTN (hypertension)    Hyperlipemia    MI, old    Nonischemic cardiomyopathy    PAD (peripheral artery disease)  s/p stents

## 2019-12-27 NOTE — H&P PST ADULT - ASSESSMENT
69 year old male with HTN, hyperlipidemia, peripheral arterial disease (s/p stents), CAD/MI/PCI (cardiomyopathy (suspected mixed ischemic and non-ischemic) and HFrEF (LVEF: 19% 10/18/19) who presents for PST for elective APC ablation.    -npo after midnight for procedure 69 year old male with BPH, HTN, hyperlipidemia, peripheral arterial disease (s/p stents), CAD/MI/PCI (cardiomyopathy (suspected mixed ischemic and non-ischemic) and HFrEF (LVEF: 19% 10/18/19) who presents for PST for elective APC ablation.    -npo after midnight for procedure 69 year old male with BPH, NIDDM, HTN, hyperlipidemia, peripheral arterial disease (s/p stents), CAD/MI/PCI (cardiomyopathy (suspected mixed ischemic and non-ischemic) and HFrEF (LVEF: 19% 10/18/19) who presents for PST for elective APC ablation.    - npo after midnight for procedure  - no metformin morning of procedure

## 2019-12-27 NOTE — H&P PST ADULT - NSICDXPASTSURGICALHX_GEN_ALL_CORE_FT
PAST SURGICAL HISTORY:  Presence of arterial stent PAST SURGICAL HISTORY:  Presence of arterial stent LLE stent

## 2020-01-08 ENCOUNTER — INPATIENT (INPATIENT)
Facility: HOSPITAL | Age: 70
LOS: 0 days | Discharge: HOME | DRG: 274 | End: 2020-01-09
Attending: INTERNAL MEDICINE | Admitting: INTERNAL MEDICINE
Payer: COMMERCIAL

## 2020-01-08 ENCOUNTER — TRANSCRIPTION ENCOUNTER (OUTPATIENT)
Age: 70
End: 2020-01-08

## 2020-01-08 VITALS
HEART RATE: 75 BPM | RESPIRATION RATE: 12 BRPM | OXYGEN SATURATION: 100 % | TEMPERATURE: 98 F | SYSTOLIC BLOOD PRESSURE: 147 MMHG | DIASTOLIC BLOOD PRESSURE: 83 MMHG

## 2020-01-08 DIAGNOSIS — I42.9 CARDIOMYOPATHY, UNSPECIFIED: ICD-10-CM

## 2020-01-08 DIAGNOSIS — Z78.9 OTHER SPECIFIED HEALTH STATUS: Chronic | ICD-10-CM

## 2020-01-08 LAB
BLD GP AB SCN SERPL QL: SIGNIFICANT CHANGE UP
GLUCOSE BLDC GLUCOMTR-MCNC: 101 MG/DL — HIGH (ref 70–99)
GLUCOSE BLDC GLUCOMTR-MCNC: 110 MG/DL — HIGH (ref 70–99)
GLUCOSE BLDC GLUCOMTR-MCNC: 89 MG/DL — SIGNIFICANT CHANGE UP (ref 70–99)

## 2020-01-08 PROCEDURE — 93010 ELECTROCARDIOGRAM REPORT: CPT

## 2020-01-08 PROCEDURE — 93623 PRGRMD STIMJ&PACG IV RX NFS: CPT | Mod: 26

## 2020-01-08 PROCEDURE — 93654 COMPRE EP EVAL TX VT: CPT

## 2020-01-08 PROCEDURE — 93621 COMP EP EVL L PAC&REC C SINS: CPT | Mod: 26

## 2020-01-08 RX ORDER — INSULIN LISPRO 100/ML
VIAL (ML) SUBCUTANEOUS
Refills: 0 | Status: DISCONTINUED | OUTPATIENT
Start: 2020-01-08 | End: 2020-01-09

## 2020-01-08 RX ORDER — OXYCODONE AND ACETAMINOPHEN 5; 325 MG/1; MG/1
1 TABLET ORAL EVERY 4 HOURS
Refills: 0 | Status: DISCONTINUED | OUTPATIENT
Start: 2020-01-08 | End: 2020-01-09

## 2020-01-08 RX ORDER — TAMSULOSIN HYDROCHLORIDE 0.4 MG/1
1 CAPSULE ORAL
Qty: 0 | Refills: 0 | DISCHARGE

## 2020-01-08 RX ORDER — OXYBUTYNIN CHLORIDE 5 MG
1 TABLET ORAL
Qty: 0 | Refills: 0 | DISCHARGE

## 2020-01-08 RX ORDER — CARVEDILOL PHOSPHATE 80 MG/1
25 CAPSULE, EXTENDED RELEASE ORAL EVERY 12 HOURS
Refills: 0 | Status: DISCONTINUED | OUTPATIENT
Start: 2020-01-08 | End: 2020-01-09

## 2020-01-08 RX ORDER — ASPIRIN/CALCIUM CARB/MAGNESIUM 324 MG
1 TABLET ORAL
Qty: 0 | Refills: 0 | DISCHARGE

## 2020-01-08 RX ORDER — SACUBITRIL AND VALSARTAN 24; 26 MG/1; MG/1
1 TABLET, FILM COATED ORAL
Qty: 0 | Refills: 0 | DISCHARGE

## 2020-01-08 RX ORDER — METFORMIN HYDROCHLORIDE 850 MG/1
1 TABLET ORAL
Qty: 0 | Refills: 0 | DISCHARGE

## 2020-01-08 RX ORDER — DEXTROSE 50 % IN WATER 50 %
25 SYRINGE (ML) INTRAVENOUS ONCE
Refills: 0 | Status: DISCONTINUED | OUTPATIENT
Start: 2020-01-08 | End: 2020-01-09

## 2020-01-08 RX ORDER — TAMSULOSIN HYDROCHLORIDE 0.4 MG/1
0.4 CAPSULE ORAL AT BEDTIME
Refills: 0 | Status: DISCONTINUED | OUTPATIENT
Start: 2020-01-08 | End: 2020-01-09

## 2020-01-08 RX ORDER — SACUBITRIL AND VALSARTAN 24; 26 MG/1; MG/1
1 TABLET, FILM COATED ORAL
Refills: 0 | Status: DISCONTINUED | OUTPATIENT
Start: 2020-01-08 | End: 2020-01-09

## 2020-01-08 RX ORDER — GLUCAGON INJECTION, SOLUTION 0.5 MG/.1ML
1 INJECTION, SOLUTION SUBCUTANEOUS ONCE
Refills: 0 | Status: DISCONTINUED | OUTPATIENT
Start: 2020-01-08 | End: 2020-01-09

## 2020-01-08 RX ORDER — OXYBUTYNIN CHLORIDE 5 MG
10 TABLET ORAL DAILY
Refills: 0 | Status: DISCONTINUED | OUTPATIENT
Start: 2020-01-08 | End: 2020-01-08

## 2020-01-08 RX ORDER — ACETAMINOPHEN 500 MG
650 TABLET ORAL EVERY 6 HOURS
Refills: 0 | Status: DISCONTINUED | OUTPATIENT
Start: 2020-01-08 | End: 2020-01-09

## 2020-01-08 RX ORDER — OXYBUTYNIN CHLORIDE 5 MG
15 TABLET ORAL DAILY
Refills: 0 | Status: DISCONTINUED | OUTPATIENT
Start: 2020-01-08 | End: 2020-01-09

## 2020-01-08 RX ORDER — CARVEDILOL PHOSPHATE 80 MG/1
1 CAPSULE, EXTENDED RELEASE ORAL
Qty: 0 | Refills: 0 | DISCHARGE

## 2020-01-08 RX ORDER — CLOPIDOGREL BISULFATE 75 MG/1
75 TABLET, FILM COATED ORAL DAILY
Refills: 0 | Status: DISCONTINUED | OUTPATIENT
Start: 2020-01-08 | End: 2020-01-09

## 2020-01-08 RX ORDER — DEXTROSE 50 % IN WATER 50 %
12.5 SYRINGE (ML) INTRAVENOUS ONCE
Refills: 0 | Status: DISCONTINUED | OUTPATIENT
Start: 2020-01-08 | End: 2020-01-09

## 2020-01-08 RX ORDER — DEXTROSE 50 % IN WATER 50 %
15 SYRINGE (ML) INTRAVENOUS ONCE
Refills: 0 | Status: DISCONTINUED | OUTPATIENT
Start: 2020-01-08 | End: 2020-01-09

## 2020-01-08 RX ORDER — SODIUM CHLORIDE 9 MG/ML
1000 INJECTION, SOLUTION INTRAVENOUS
Refills: 0 | Status: DISCONTINUED | OUTPATIENT
Start: 2020-01-08 | End: 2020-01-09

## 2020-01-08 RX ORDER — ASPIRIN/CALCIUM CARB/MAGNESIUM 324 MG
81 TABLET ORAL DAILY
Refills: 0 | Status: DISCONTINUED | OUTPATIENT
Start: 2020-01-08 | End: 2020-01-09

## 2020-01-08 RX ADMIN — TAMSULOSIN HYDROCHLORIDE 0.4 MILLIGRAM(S): 0.4 CAPSULE ORAL at 20:47

## 2020-01-08 RX ADMIN — SACUBITRIL AND VALSARTAN 1 TABLET(S): 24; 26 TABLET, FILM COATED ORAL at 18:47

## 2020-01-08 RX ADMIN — CARVEDILOL PHOSPHATE 25 MILLIGRAM(S): 80 CAPSULE, EXTENDED RELEASE ORAL at 18:47

## 2020-01-08 NOTE — DISCHARGE NOTE PROVIDER - CARE PROVIDERS DIRECT ADDRESSES
,DirectAddress_Unknown ,DirectAddress_Unknown,[email or web address]

## 2020-01-08 NOTE — DISCHARGE NOTE PROVIDER - NSDCFUADDINST_GEN_ALL_CORE_FT
Follow up with Dr. Cee in two weeks time. The office will call you with an appointment. Follow up with Dr. Cee in 1-2 weeks.  Our office will contact you in 3-5 days to schedule this appointment. Please call [phone] with questions or concerns.

## 2020-01-08 NOTE — PROGRESS NOTE ADULT - SUBJECTIVE AND OBJECTIVE BOX
ELECTROPHYSIOLOGY BRIEF POST-OP NOTE    I have personally seen and examined the patient. I agree with the history and physical which I have reviewed and noted any changes below.      PRE-OP DIAGNOSIS: APCs, Atrial Bigeminy    POST-OP DIAGNOSIS: SR    PROCEDURE: APC ablation     Physician: Calvin Cee MD  Assistant: None    ESTIMATED BLOOD LOSS: <10mL    ANESTHESIA TYPE:  [  ]General Anesthesia  [ x ]Sedation  [  ]Local/Regional    CONDITION:  [  ]Critical  [  ]Serious  [ x ]Stable  [  ]Good    FINDINGS: APC originating at the 10 o'clock position on the tricuspid valve annulus    EKG: NSR at 53bpm; QRSD 146ms; RBBB    Vital Signs Last 24 Hrs  T(C): 36.6 (08 Jan 2020 11:45), Max: 36.6 (08 Jan 2020 11:45)  T(F): 97.9 (08 Jan 2020 11:45), Max: 97.9 (08 Jan 2020 11:45)  HR: 62 (08 Jan 2020 15:20) (60 - 75)  BP: 144/86 (08 Jan 2020 15:20) (144/86 - 155/96)  RR: 15 (08 Jan 2020 15:20) (12 - 15)  SpO2: 96% (08 Jan 2020 15:20) (92% - 100%)    Physical Exam:  Constitutional: NAD, AAOx3  Cardiovascular: +S1S2 RRR  Pulmonary: CTA b/l, unlabored  GI: soft NTND +BS  Extremities: no pedal edema  Right Groin: No hematoma. Dressing with figure 8 suture CDI  Neuro: non focal, MEDEROS x4    A/P  69 year old male with BPH, NIDDM, HTN, hyperlipidemia, peripheral arterial disease (s/p stents), CAD/MI/PCI (cardiomyopathy; suspected mixed ischemic and non-ischemic) and HFrEF (LVEF: 19% 10/18/19) who is s/p successful ablation of APCs originating on tricuspid valve annulus at 10 oclock position.     -Bedrest x 4 hours  -continue beta blockers and Aspirin  -Repeat Echo in 30 days. Will consider device implant once results are available  -AM labs and EKG  -Figure 8 suture to be removed in AM  -Discharge planning home once stable.

## 2020-01-08 NOTE — DISCHARGE NOTE PROVIDER - CARE PROVIDER_API CALL
Calvin Cee)  Cardiology; Internal Medicine  [address] [place] [address], [place] [address]  Phone: [phone]  Fax: [phone]  Established Patient  Follow Up Time: 2 weeks Calvin Cee)  Cardiology; Internal Medicine  [address] [place] [address], [place] [address]  Phone: [phone]  Fax: [phone]  Established Patient  Follow Up Time: 2 weeks    Charles Justin)  Nuclear Cardiology  [address] [place] [address]  Phone: [phone]  Fax: [phone]  Follow Up Time:

## 2020-01-08 NOTE — DISCHARGE NOTE PROVIDER - NSDCMRMEDTOKEN_GEN_ALL_CORE_FT
aspirin 81 mg oral tablet, chewable: 1 tab(s) orally once a day  clopidogrel 75 mg oral tablet: 1 tab(s) orally once a day  Coreg CR 40 mg oral capsule, extended release: 1 cap(s) orally once a day (in the morning)  Glucophage 500 mg oral tablet: 1 tab(s) orally 2 times a day (with meals)  oxybutynin 15 mg/24 hr oral tablet, extended release: 1 tab(s) orally once a day  sacubitril-valsartan 97 mg-103 mg oral tablet: 1 tab(s) orally 2 times a day  tamsulosin 0.4 mg oral capsule: 1 cap(s) orally once a day

## 2020-01-08 NOTE — DISCHARGE NOTE PROVIDER - PROVIDER TOKENS
PROVIDER:[TOKEN:[[id number]],FOLLOWUP:[2 weeks],ESTABLISHEDPATIENT:[T]] PROVIDER:[TOKEN:[[id number]],FOLLOWUP:[2 weeks],ESTABLISHEDPATIENT:[T]],PROVIDER:[TOKEN:[[id number]]]

## 2020-01-08 NOTE — DISCHARGE NOTE PROVIDER - HOSPITAL COURSE
69 year old male with BPH, NIDDM, HTN, hyperlipidemia, peripheral arterial disease (s/p stents), CAD/MI/PCI (cardiomyopathy; suspected mixed ischemic and non-ischemic) and HFrEF (LVEF: 19% 10/18/19).  POD#1 successful ablation of APCs originating on tricuspid valve annulus at 10 o'clock position.  The patient was observed overnight without event, right groin suture removed without complication.  He was  discharged home the following morning with a plan for outpatient follow up and repeat echo in 30 days.

## 2020-01-08 NOTE — DISCHARGE NOTE PROVIDER - NSDCCPCAREPLAN_GEN_ALL_CORE_FT
PRINCIPAL DISCHARGE DIAGNOSIS  Diagnosis: APC (atrial premature contractions)  Assessment and Plan of Treatment:

## 2020-01-09 ENCOUNTER — TRANSCRIPTION ENCOUNTER (OUTPATIENT)
Age: 70
End: 2020-01-09

## 2020-01-09 VITALS
OXYGEN SATURATION: 98 % | SYSTOLIC BLOOD PRESSURE: 149 MMHG | HEART RATE: 76 BPM | DIASTOLIC BLOOD PRESSURE: 72 MMHG | RESPIRATION RATE: 16 BRPM

## 2020-01-09 LAB
ANION GAP SERPL CALC-SCNC: 9 MMOL/L — SIGNIFICANT CHANGE UP (ref 5–17)
BUN SERPL-MCNC: 11 MG/DL — SIGNIFICANT CHANGE UP (ref 8–20)
CALCIUM SERPL-MCNC: 8.1 MG/DL — LOW (ref 8.6–10.2)
CHLORIDE SERPL-SCNC: 106 MMOL/L — SIGNIFICANT CHANGE UP (ref 98–107)
CO2 SERPL-SCNC: 24 MMOL/L — SIGNIFICANT CHANGE UP (ref 22–29)
CREAT SERPL-MCNC: 0.99 MG/DL — SIGNIFICANT CHANGE UP (ref 0.5–1.3)
GLUCOSE BLDC GLUCOMTR-MCNC: 129 MG/DL — HIGH (ref 70–99)
GLUCOSE SERPL-MCNC: 108 MG/DL — SIGNIFICANT CHANGE UP (ref 70–115)
HCT VFR BLD CALC: 46.2 % — SIGNIFICANT CHANGE UP (ref 39–50)
HGB BLD-MCNC: 14.8 G/DL — SIGNIFICANT CHANGE UP (ref 13–17)
MAGNESIUM SERPL-MCNC: 2.2 MG/DL — SIGNIFICANT CHANGE UP (ref 1.6–2.6)
MCHC RBC-ENTMCNC: 30.9 PG — SIGNIFICANT CHANGE UP (ref 27–34)
MCHC RBC-ENTMCNC: 32 GM/DL — SIGNIFICANT CHANGE UP (ref 32–36)
MCV RBC AUTO: 96.5 FL — SIGNIFICANT CHANGE UP (ref 80–100)
PLATELET # BLD AUTO: 142 K/UL — LOW (ref 150–400)
POTASSIUM SERPL-MCNC: 4.2 MMOL/L — SIGNIFICANT CHANGE UP (ref 3.5–5.3)
POTASSIUM SERPL-SCNC: 4.2 MMOL/L — SIGNIFICANT CHANGE UP (ref 3.5–5.3)
RBC # BLD: 4.79 M/UL — SIGNIFICANT CHANGE UP (ref 4.2–5.8)
RBC # FLD: 13.9 % — SIGNIFICANT CHANGE UP (ref 10.3–14.5)
SODIUM SERPL-SCNC: 139 MMOL/L — SIGNIFICANT CHANGE UP (ref 135–145)
WBC # BLD: 3.86 K/UL — SIGNIFICANT CHANGE UP (ref 3.8–10.5)
WBC # FLD AUTO: 3.86 K/UL — SIGNIFICANT CHANGE UP (ref 3.8–10.5)

## 2020-01-09 PROCEDURE — C1730: CPT

## 2020-01-09 PROCEDURE — 82962 GLUCOSE BLOOD TEST: CPT

## 2020-01-09 PROCEDURE — 85027 COMPLETE CBC AUTOMATED: CPT

## 2020-01-09 PROCEDURE — 36415 COLL VENOUS BLD VENIPUNCTURE: CPT

## 2020-01-09 PROCEDURE — 86901 BLOOD TYPING SEROLOGIC RH(D): CPT

## 2020-01-09 PROCEDURE — 83735 ASSAY OF MAGNESIUM: CPT

## 2020-01-09 PROCEDURE — 93005 ELECTROCARDIOGRAM TRACING: CPT

## 2020-01-09 PROCEDURE — C1766: CPT

## 2020-01-09 PROCEDURE — C2630: CPT

## 2020-01-09 PROCEDURE — C1731: CPT

## 2020-01-09 PROCEDURE — 93010 ELECTROCARDIOGRAM REPORT: CPT

## 2020-01-09 PROCEDURE — 93623 PRGRMD STIMJ&PACG IV RX NFS: CPT

## 2020-01-09 PROCEDURE — 86900 BLOOD TYPING SEROLOGIC ABO: CPT

## 2020-01-09 PROCEDURE — 93654 COMPRE EP EVAL TX VT: CPT

## 2020-01-09 PROCEDURE — 80048 BASIC METABOLIC PNL TOTAL CA: CPT

## 2020-01-09 PROCEDURE — C1732: CPT

## 2020-01-09 PROCEDURE — 86850 RBC ANTIBODY SCREEN: CPT

## 2020-01-09 PROCEDURE — 93621 COMP EP EVL L PAC&REC C SINS: CPT

## 2020-01-09 RX ADMIN — CLOPIDOGREL BISULFATE 75 MILLIGRAM(S): 75 TABLET, FILM COATED ORAL at 09:44

## 2020-01-09 RX ADMIN — Medication 81 MILLIGRAM(S): at 09:44

## 2020-01-09 RX ADMIN — CARVEDILOL PHOSPHATE 25 MILLIGRAM(S): 80 CAPSULE, EXTENDED RELEASE ORAL at 05:25

## 2020-01-09 RX ADMIN — SACUBITRIL AND VALSARTAN 1 TABLET(S): 24; 26 TABLET, FILM COATED ORAL at 05:26

## 2020-01-09 NOTE — PROGRESS NOTE ADULT - SUBJECTIVE AND OBJECTIVE BOX
Pt doing well s/p elective APC ablation.  Pt with no overnight events and no complaints this morning.  Right groin suture removed without difficulty, pt tolerated well.      MEDICATIONS  (STANDING):  aspirin  chewable 81 milliGRAM(s) Oral daily  carvedilol 25 milliGRAM(s) Oral every 12 hours  clopidogrel Tablet 75 milliGRAM(s) Oral daily  dextrose 5%. 1000 milliLiter(s) (50 mL/Hr) IV Continuous <Continuous>  dextrose 50% Injectable 12.5 Gram(s) IV Push once  dextrose 50% Injectable 25 Gram(s) IV Push once  dextrose 50% Injectable 25 Gram(s) IV Push once  insulin lispro (HumaLOG) corrective regimen sliding scale   SubCutaneous three times a day before meals  oxybutynin XL 15 milliGRAM(s) Oral daily  sacubitril 97 mG/valsartan 103 mG 1 Tablet(s) Oral two times a day  tamsulosin 0.4 milliGRAM(s) Oral at bedtime    MEDICATIONS  (PRN):  acetaminophen   Tablet .. 650 milliGRAM(s) Oral every 6 hours PRN Mild Pain (1 - 3)  aluminum hydroxide/magnesium hydroxide/simethicone Suspension 30 milliLiter(s) Oral every 4 hours PRN Dyspepsia  dextrose 40% Gel 15 Gram(s) Oral once PRN Blood Glucose LESS THAN 70 milliGRAM(s)/deciliter  glucagon  Injectable 1 milliGRAM(s) IntraMuscular once PRN Glucose LESS THAN 70 milligrams/deciliter  oxycodone    5 mG/acetaminophen 325 mG 1 Tablet(s) Oral every 4 hours PRN Moderate Pain (4 - 6)    Allergies:  No Known Allergies    VS:   T(C): 36.7 (01-08-20 @ 19:23), Max: 36.7 (01-08-20 @ 19:23)  HR: 56 (01-09-20 @ 05:15) (55 - 75)  BP: 153/83 (01-09-20 @ 05:15) (134/73 - 155/96)  RR: 16 (01-09-20 @ 05:15) (12 - 17)  SpO2: 98% (01-09-20 @ 05:15) (92% - 100%)    Physical exam:   VSS, NAD, A&O x 3  Card: S1/S2, RRR, no m/g/r  Resp: lungs CTA b/l  Abd: S/NT/ND  Groin (right): no bleeding, hematoma, erythema, exudate or edema  Ext: no edema; distal pulses intact    Labs:                        14.8   3.86  )-----------( 142      ( 09 Jan 2020 05:31 )             46.2     ECG: sinus bradycardia 56bpm RBBB    Assessment:   69 year old male with BPH, NIDDM, HTN, hyperlipidemia, peripheral arterial disease (s/p stents), CAD/MI/PCI (cardiomyopathy; suspected mixed ischemic and non-ischemic) and HFrEF (LVEF: 19% 10/18/19).  POD#1 successful ablation of APCs originating on tricuspid valve annulus at 10 oclock position.     Plan:   -stable for d/c home pending BMP results  -continue beta blockers and Aspirin  -Repeat Echo as outpt in 30 days, consider device implant pending results of echo.    -Discussed with Dr. Cee Pt doing well s/p elective APC ablation.  Pt with no overnight events and no complaints this morning.  Right groin suture removed without difficulty, pt tolerated well.      MEDICATIONS  (STANDING):  aspirin  chewable 81 milliGRAM(s) Oral daily  carvedilol 25 milliGRAM(s) Oral every 12 hours  clopidogrel Tablet 75 milliGRAM(s) Oral daily  dextrose 5%. 1000 milliLiter(s) (50 mL/Hr) IV Continuous <Continuous>  dextrose 50% Injectable 12.5 Gram(s) IV Push once  dextrose 50% Injectable 25 Gram(s) IV Push once  dextrose 50% Injectable 25 Gram(s) IV Push once  insulin lispro (HumaLOG) corrective regimen sliding scale   SubCutaneous three times a day before meals  oxybutynin XL 15 milliGRAM(s) Oral daily  sacubitril 97 mG/valsartan 103 mG 1 Tablet(s) Oral two times a day  tamsulosin 0.4 milliGRAM(s) Oral at bedtime    MEDICATIONS  (PRN):  acetaminophen   Tablet .. 650 milliGRAM(s) Oral every 6 hours PRN Mild Pain (1 - 3)  aluminum hydroxide/magnesium hydroxide/simethicone Suspension 30 milliLiter(s) Oral every 4 hours PRN Dyspepsia  dextrose 40% Gel 15 Gram(s) Oral once PRN Blood Glucose LESS THAN 70 milliGRAM(s)/deciliter  glucagon  Injectable 1 milliGRAM(s) IntraMuscular once PRN Glucose LESS THAN 70 milligrams/deciliter  oxycodone    5 mG/acetaminophen 325 mG 1 Tablet(s) Oral every 4 hours PRN Moderate Pain (4 - 6)    Allergies:  No Known Allergies    VS:   T(C): 36.7 (01-08-20 @ 19:23), Max: 36.7 (01-08-20 @ 19:23)  HR: 56 (01-09-20 @ 05:15) (55 - 75)  BP: 153/83 (01-09-20 @ 05:15) (134/73 - 155/96)  RR: 16 (01-09-20 @ 05:15) (12 - 17)  SpO2: 98% (01-09-20 @ 05:15) (92% - 100%)    Physical exam:   VSS, NAD, A&O x 3  Card: S1/S2, RRR, no m/g/r  Resp: lungs CTA b/l  Abd: S/NT/ND  Groin (right): no bleeding, hematoma, erythema, exudate or edema  Ext: no edema; distal pulses intact    TELE: no events noted, sinus rhythm     Labs:                        14.8   3.86  )-----------( 142      ( 09 Jan 2020 05:31 )             46.2   01-09    139  |  106  |  11.0  ----------------------------<  108  4.2   |  24.0  |  0.99    Ca    8.1<L>      09 Jan 2020 08:01  Mg     2.2     01-09      ECG: sinus bradycardia 56bpm RBBB    Assessment:   69 year old male with BPH, NIDDM, HTN, hyperlipidemia, peripheral arterial disease (s/p stents), CAD/MI/PCI (cardiomyopathy; suspected mixed ischemic and non-ischemic) and HFrEF (LVEF: 19% 10/18/19).  POD#1 successful ablation of APCs originating on tricuspid valve annulus at 10 oclock position.     Plan:   -stable for d/c home pending BMP results  -continue beta blockers and Aspirin  -Repeat Echo as outpt in 30 days, consider device implant pending results of echo.    -Discussed with Dr. Cee

## 2020-01-09 NOTE — DISCHARGE NOTE NURSING/CASE MANAGEMENT/SOCIAL WORK - PATIENT PORTAL LINK FT
You can access the FollowMyHealth Patient Portal offered by [facility] by registering at the following website: [email or web address]. By joining [facility]’s FollowMyHealth portal, you will also be able to view your health information using other applications (apps) compatible with our system.

## 2020-01-13 PROBLEM — I25.10 ATHEROSCLEROTIC HEART DISEASE OF NATIVE CORONARY ARTERY WITHOUT ANGINA PECTORIS: Chronic | Status: ACTIVE | Noted: 2019-02-11

## 2020-01-13 PROBLEM — E11.9 TYPE 2 DIABETES MELLITUS WITHOUT COMPLICATIONS: Chronic | Status: ACTIVE | Noted: 2019-12-27

## 2020-01-21 ENCOUNTER — APPOINTMENT (OUTPATIENT)
Dept: ELECTROPHYSIOLOGY | Facility: CLINIC | Age: 70
End: 2020-01-21
Payer: COMMERCIAL

## 2020-01-21 ENCOUNTER — NON-APPOINTMENT (OUTPATIENT)
Age: 70
End: 2020-01-21

## 2020-01-21 VITALS
OXYGEN SATURATION: 100 % | HEIGHT: 66 IN | WEIGHT: 177 LBS | BODY MASS INDEX: 28.45 KG/M2 | HEART RATE: 61 BPM | DIASTOLIC BLOOD PRESSURE: 77 MMHG | SYSTOLIC BLOOD PRESSURE: 152 MMHG

## 2020-01-21 PROCEDURE — 93000 ELECTROCARDIOGRAM COMPLETE: CPT

## 2020-01-21 PROCEDURE — 99214 OFFICE O/P EST MOD 30 MIN: CPT | Mod: 25

## 2020-01-21 NOTE — DISCUSSION/SUMMARY
[FreeTextEntry1] : Mr. Owens is a 69 year old male who presents today for management of cardiomyopathy s/p APC ablation.\par \par History significant for hyperlipidemia, peripheral arterial disease (s/p Stents) , hypertension, coronary artery disease ( with MI s/p PCI) with suspectedmixed ischemic/non-ischemic cardiomyopathy, NYHA IIC Hfref (EF 19% 10/18/19) who was initially referred by Dr. Justin in consultation for consideration of cardiac resynchronization therapy.  During initial consult frequent APC's were noted, and 48 hour holter monitor then revealed SVE burden 30.7% despite beta blocker up titration. He subsequently underwent ablation for APC's on 1/8/20 ( Successful termination of focal APC arising from 10 o'clock on the tricuspid annulus. No dual AVN physiology, abnormal AVN, atrial and ventricular refractory periods consistent with history of cardiomyopathy).\par \par Since procedure patient has felt well with resolution of his palpitations or "heart thumping" sensation as he describes it. Denies chest pain, shortness of breath, lightheadedness, dizziness. He did experience mild tenderness to right groin access site which has resolved. \par \par Patient seen and plan formulated with Dr. Cee.\par \par EKG today reveals SB with RBBB, Old IWMI, no Atrial ectopy noted. To repeat echo 1 month post ablation and if EF remains depressed to follow up to consider cardiac resynchronization therapy. \par \par Loly Raygoza ANP-C \par

## 2020-01-21 NOTE — END OF VISIT
[FreeTextEntry3] : I was physically present for the key portions of the evaluation and management (E/M) service provided. I agree with the above history, physical, and plan which I have reviewed and edited where appropriate.

## 2020-01-21 NOTE — HISTORY OF PRESENT ILLNESS
[FreeTextEntry1] : Mr. Owens is a 69 year old male who presents today for management of cardiomyopathy s/p APC ablation.\par \par History significant for hyperlipidemia, peripheral arterial disease (s/p Stents) , hypertension, coronary artery disease ( with MI s/p PCI) with suspectedmixed ischemic/non-ischemic cardiomyopathy, NYHA IIC Hfref (EF 19% 10/18/19) who was initially referred by Dr. Justin in consultation for consideration of cardiac resynchronization therapy.  During initial consult frequent APC's were noted, and 48 hour holter monitor then revealed SVE burden 30.7% despite beta blocker up titration. He subsequently underwent ablation for APC's on 1/8/20 ( Successful termination of focal APC arising from 10 o'clock on the tricuspid annulus. No dual AVN physiology, abnormal AVN, atrial and ventricular refractory periods consistent with history of cardiomyopathy).\par \par Since procedure patient has felt well with resolution of his palpitations or "heart thumping" sensation as he describes it. Denies chest pain, shortness of breath, lightheadedness, dizziness. He did experience mild tenderness to right groin access site which has resolved. \par \par

## 2020-01-21 NOTE — REVIEW OF SYSTEMS
[Shortness Of Breath] : no shortness of breath [Feeling Fatigued] : not feeling fatigued [Headache] : no headache [Dyspnea on exertion] : not dyspnea during exertion [Lower Ext Edema] : no extremity edema [Chest Pain] : no chest pain [Nausea] : no nausea [Palpitations] : no palpitations [Vomiting] : no vomiting [Dizziness] : no dizziness [FreeTextEntry1] : L

## 2020-01-21 NOTE — PHYSICAL EXAM
[] : no respiratory distress [General Appearance - Well Nourished] : well nourished [General Appearance - Well Developed] : well developed [Auscultation Breath Sounds / Voice Sounds] : lungs were clear to auscultation bilaterally [Respiration, Rhythm And Depth] : normal respiratory rhythm and effort [Heart Sounds] : normal S1 and S2 [Heart Rate And Rhythm] : heart rate and rhythm were normal [Murmurs] : no murmurs present [Abdomen Soft] : soft [Abnormal Walk] : normal gait [Abdomen Tenderness] : non-tender [FreeTextEntry1] : Left femoral access site with dime sized hematoma, soft non tender, no bruit

## 2020-01-21 NOTE — REASON FOR VISIT
[Follow-Up - From Hospitalization] : follow-up of a recent hospitalization for [FreeTextEntry2] : S/p APC ablation 1/8/20

## 2020-01-31 ENCOUNTER — APPOINTMENT (OUTPATIENT)
Dept: CARDIOLOGY | Facility: CLINIC | Age: 70
End: 2020-01-31

## 2020-01-31 VITALS
BODY MASS INDEX: 28.45 KG/M2 | SYSTOLIC BLOOD PRESSURE: 144 MMHG | HEART RATE: 64 BPM | OXYGEN SATURATION: 98 % | HEIGHT: 66 IN | DIASTOLIC BLOOD PRESSURE: 78 MMHG | WEIGHT: 177 LBS

## 2020-02-20 ENCOUNTER — APPOINTMENT (OUTPATIENT)
Dept: CARDIOLOGY | Facility: CLINIC | Age: 70
End: 2020-02-20
Payer: COMMERCIAL

## 2020-02-20 PROCEDURE — 93306 TTE W/DOPPLER COMPLETE: CPT

## 2020-03-27 ENCOUNTER — APPOINTMENT (OUTPATIENT)
Dept: CARDIOLOGY | Facility: CLINIC | Age: 70
End: 2020-03-27
Payer: COMMERCIAL

## 2020-03-27 ENCOUNTER — NON-APPOINTMENT (OUTPATIENT)
Age: 70
End: 2020-03-27

## 2020-03-27 VITALS
HEIGHT: 66 IN | BODY MASS INDEX: 29.25 KG/M2 | DIASTOLIC BLOOD PRESSURE: 84 MMHG | SYSTOLIC BLOOD PRESSURE: 152 MMHG | WEIGHT: 182 LBS | OXYGEN SATURATION: 100 % | HEART RATE: 60 BPM | TEMPERATURE: 98.1 F

## 2020-03-27 VITALS — SYSTOLIC BLOOD PRESSURE: 152 MMHG | DIASTOLIC BLOOD PRESSURE: 78 MMHG

## 2020-03-27 PROCEDURE — 99214 OFFICE O/P EST MOD 30 MIN: CPT

## 2020-03-27 PROCEDURE — 93000 ELECTROCARDIOGRAM COMPLETE: CPT

## 2020-03-31 ENCOUNTER — APPOINTMENT (OUTPATIENT)
Dept: ELECTROPHYSIOLOGY | Facility: CLINIC | Age: 70
End: 2020-03-31

## 2020-05-04 ENCOUNTER — RX RENEWAL (OUTPATIENT)
Age: 70
End: 2020-05-04

## 2020-05-08 LAB
ANION GAP SERPL CALC-SCNC: 13 MMOL/L
BUN SERPL-MCNC: 13 MG/DL
CALCIUM SERPL-MCNC: 9.2 MG/DL
CHLORIDE SERPL-SCNC: 101 MMOL/L
CO2 SERPL-SCNC: 27 MMOL/L
CREAT SERPL-MCNC: 1.16 MG/DL
GLUCOSE SERPL-MCNC: 81 MG/DL
POTASSIUM SERPL-SCNC: 4.3 MMOL/L
SODIUM SERPL-SCNC: 141 MMOL/L

## 2020-05-26 ENCOUNTER — APPOINTMENT (OUTPATIENT)
Dept: ELECTROPHYSIOLOGY | Facility: CLINIC | Age: 70
End: 2020-05-26
Payer: COMMERCIAL

## 2020-05-26 VITALS
WEIGHT: 180 LBS | HEIGHT: 66 IN | BODY MASS INDEX: 28.93 KG/M2 | SYSTOLIC BLOOD PRESSURE: 153 MMHG | TEMPERATURE: 98 F | HEART RATE: 52 BPM | DIASTOLIC BLOOD PRESSURE: 74 MMHG | OXYGEN SATURATION: 99 %

## 2020-05-26 VITALS — DIASTOLIC BLOOD PRESSURE: 70 MMHG | SYSTOLIC BLOOD PRESSURE: 148 MMHG

## 2020-05-26 DIAGNOSIS — I50.42 CHRONIC COMBINED SYSTOLIC (CONGESTIVE) AND DIASTOLIC (CONGESTIVE) HEART FAILURE: ICD-10-CM

## 2020-05-26 DIAGNOSIS — I45.10 UNSPECIFIED RIGHT BUNDLE-BRANCH BLOCK: ICD-10-CM

## 2020-05-26 PROCEDURE — 93000 ELECTROCARDIOGRAM COMPLETE: CPT

## 2020-05-26 PROCEDURE — 99215 OFFICE O/P EST HI 40 MIN: CPT | Mod: 25

## 2020-05-26 NOTE — DISCUSSION/SUMMARY
[FreeTextEntry1] : AGUS HENDRIX is a 70 year old male with hyperlipidemia, peripheral arterial disease (s/p stents), hypertension and coronary artery disease (with MI s/p PCI) with cardiomyopathy (suspected mixed ischemic and non-ischemic) and NYHA IIC HFrEF (LVEF: 20-25% 2/2020) who presents for follow up. Despite frequent APC ablation, his LVEF remains low. 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 The risks explained include, but are not limited to: pain, bleeding, infection, contrast reaction, acute kidney injury, radiation-induced skin irritation and/or burning, vascular injury and/or thrombosis, pneumothorax, cardiac perforation and/or tamponade, valvular injury, pocket hematoma, lead dislodgement, device infection requiring extraction, lead fracture/de-insulation/failure, inappropriate shocks, death, heart attack, or stroke. The opportunity to ask questions was provided and all were answered to the patient's satisfaction.\par \par At this juncture, he agrees to proceed with CRT implantation.\par \par Recommendations:\par - TTE\par - CRT-D implantation (MDT)\par \par Calvin Cee MD\par Clinical Cardiac Electrophysiology

## 2020-05-26 NOTE — PHYSICAL EXAM
[General Appearance - Well Developed] : well developed [Normal Appearance] : normal appearance [General Appearance - Well Nourished] : well nourished [Normal Conjunctiva] : the conjunctiva exhibited no abnormalities [Respiration, Rhythm And Depth] : normal respiratory rhythm and effort [Auscultation Breath Sounds / Voice Sounds] : lungs were clear to auscultation bilaterally [Heart Rate And Rhythm] : heart rate and rhythm were normal [Heart Sounds] : normal S1 and S2 [Bowel Sounds] : normal bowel sounds [Murmurs] : no murmurs present [Abdomen Tenderness] : non-tender [Abdomen Soft] : soft [Nail Clubbing] : no clubbing of the fingernails [Cyanosis, Localized] : no localized cyanosis [Skin Turgor] : normal skin turgor [] : no rash [Skin Color & Pigmentation] : normal skin color and pigmentation

## 2020-05-26 NOTE — HISTORY OF PRESENT ILLNESS
[FreeTextEntry1] : AGUS HENDRIX is a 70 year old male with hyperlipidemia, peripheral arterial disease (s/p stents), hypertension and coronary artery disease (with MI s/p PCI) with cardiomyopathy (suspected mixed ischemic and non-ischemic) and NYHA IIC HFrEF (LVEF: 20-25% 2/2020) who presents for follow up.\par \par To summarize his history, he had an inferior wall MI in 2005 status post PCI and brief shock requiring IABP support. His LVEF was initially <20% but improved up to 40-45% in 11/2017. Repeat TTE on 1/31/19 demonstrated a newly depressed LVEF of 20-25%. This drop in LVEF occurred in the setting of increase in alcohol intake. HIs medical regimen was intensified including increasing Carvedilol and changing to Entresto. Despite intensification of his medical regimen and cessation of alcohol, his LVEF has continued to drop.\par \par He was found in 11/2019 to high APC burden (30.7%) and so underwent APC ablation arising from 10 o'clock on the tricuspid annulus. He did well afterwards without any recurrent APCs. Repeat EF 1 month after ablation showed no significant change in LVEF. Notably, he did feel better with less "heart thumping" sensation.\par \par Today, he continues to feel well without palpitations. He notes that gets some dyspnea after walking up a flight of stairs. He denies any fevers, cough, chills, chest pain, orthopnea, paroxysmal nocturnal dyspnea, peripheral edema, lightheadedness, dizziness, syncope, nausea, vomiting, diaphoresis, melena, hematochezia, or hematemesis.

## 2020-06-11 ENCOUNTER — APPOINTMENT (OUTPATIENT)
Dept: CARDIOLOGY | Facility: CLINIC | Age: 70
End: 2020-06-11
Payer: COMMERCIAL

## 2020-06-11 PROCEDURE — 93325 DOPPLER ECHO COLOR FLOW MAPG: CPT

## 2020-06-11 PROCEDURE — 93308 TTE F-UP OR LMTD: CPT

## 2020-06-11 PROCEDURE — 93321 DOPPLER ECHO F-UP/LMTD STD: CPT

## 2020-07-10 ENCOUNTER — APPOINTMENT (OUTPATIENT)
Dept: CARDIOLOGY | Facility: CLINIC | Age: 70
End: 2020-07-10
Payer: COMMERCIAL

## 2020-07-10 ENCOUNTER — NON-APPOINTMENT (OUTPATIENT)
Age: 70
End: 2020-07-10

## 2020-07-10 VITALS
TEMPERATURE: 98.4 F | BODY MASS INDEX: 30.05 KG/M2 | DIASTOLIC BLOOD PRESSURE: 80 MMHG | OXYGEN SATURATION: 100 % | SYSTOLIC BLOOD PRESSURE: 157 MMHG | HEIGHT: 66 IN | HEART RATE: 58 BPM | WEIGHT: 187 LBS

## 2020-07-10 VITALS — DIASTOLIC BLOOD PRESSURE: 80 MMHG | SYSTOLIC BLOOD PRESSURE: 144 MMHG

## 2020-07-10 PROCEDURE — 99214 OFFICE O/P EST MOD 30 MIN: CPT

## 2020-07-10 PROCEDURE — 93000 ELECTROCARDIOGRAM COMPLETE: CPT

## 2020-09-01 NOTE — H&P PST ADULT - BLOOD AVOIDANCE/RESTRICTIONS, PROFILE
Occupational Therapy    Chart reviewed, attempted to see pt for follow up treatment this date; pt on hold due to LIMA graft this am, on bedrest at least 4 hrs;     [facility] none

## 2021-01-15 ENCOUNTER — APPOINTMENT (OUTPATIENT)
Dept: CARDIOLOGY | Facility: CLINIC | Age: 71
End: 2021-01-15
Payer: COMMERCIAL

## 2021-01-15 ENCOUNTER — NON-APPOINTMENT (OUTPATIENT)
Age: 71
End: 2021-01-15

## 2021-01-15 VITALS
OXYGEN SATURATION: 100 % | HEIGHT: 66 IN | BODY MASS INDEX: 29.41 KG/M2 | TEMPERATURE: 98.7 F | SYSTOLIC BLOOD PRESSURE: 154 MMHG | DIASTOLIC BLOOD PRESSURE: 82 MMHG | RESPIRATION RATE: 16 BRPM | WEIGHT: 183 LBS | HEART RATE: 52 BPM

## 2021-01-15 VITALS — DIASTOLIC BLOOD PRESSURE: 84 MMHG | SYSTOLIC BLOOD PRESSURE: 132 MMHG

## 2021-01-15 DIAGNOSIS — Z86.79 OTHER SPECIFIED POSTPROCEDURAL STATES: ICD-10-CM

## 2021-01-15 DIAGNOSIS — R73.03 PREDIABETES.: ICD-10-CM

## 2021-01-15 DIAGNOSIS — Z98.890 OTHER SPECIFIED POSTPROCEDURAL STATES: ICD-10-CM

## 2021-01-15 DIAGNOSIS — I49.1 ATRIAL PREMATURE DEPOLARIZATION: ICD-10-CM

## 2021-01-15 PROCEDURE — 99214 OFFICE O/P EST MOD 30 MIN: CPT

## 2021-01-15 PROCEDURE — 93000 ELECTROCARDIOGRAM COMPLETE: CPT

## 2021-01-15 PROCEDURE — 99072 ADDL SUPL MATRL&STAF TM PHE: CPT

## 2021-01-27 ENCOUNTER — APPOINTMENT (OUTPATIENT)
Dept: CARDIOLOGY | Facility: CLINIC | Age: 71
End: 2021-01-27
Payer: COMMERCIAL

## 2021-01-27 PROCEDURE — 93925 LOWER EXTREMITY STUDY: CPT

## 2021-01-27 PROCEDURE — 99072 ADDL SUPL MATRL&STAF TM PHE: CPT

## 2021-01-27 PROCEDURE — 93306 TTE W/DOPPLER COMPLETE: CPT

## 2021-02-01 ENCOUNTER — RX RENEWAL (OUTPATIENT)
Age: 71
End: 2021-02-01

## 2021-02-11 ENCOUNTER — NON-APPOINTMENT (OUTPATIENT)
Age: 71
End: 2021-02-11

## 2021-03-04 ENCOUNTER — OUTPATIENT (OUTPATIENT)
Dept: OUTPATIENT SERVICES | Facility: HOSPITAL | Age: 71
LOS: 1 days | End: 2021-03-04
Payer: COMMERCIAL

## 2021-03-04 ENCOUNTER — APPOINTMENT (OUTPATIENT)
Dept: CT IMAGING | Facility: CLINIC | Age: 71
End: 2021-03-04

## 2021-03-04 ENCOUNTER — RESULT REVIEW (OUTPATIENT)
Age: 71
End: 2021-03-04

## 2021-03-04 DIAGNOSIS — Z78.9 OTHER SPECIFIED HEALTH STATUS: Chronic | ICD-10-CM

## 2021-03-04 DIAGNOSIS — I73.9 PERIPHERAL VASCULAR DISEASE, UNSPECIFIED: ICD-10-CM

## 2021-03-04 PROCEDURE — 75635 CT ANGIO ABDOMINAL ARTERIES: CPT | Mod: 26

## 2021-03-04 PROCEDURE — 82565 ASSAY OF CREATININE: CPT

## 2021-03-04 PROCEDURE — 75635 CT ANGIO ABDOMINAL ARTERIES: CPT

## 2021-05-03 ENCOUNTER — RX RENEWAL (OUTPATIENT)
Age: 71
End: 2021-05-03

## 2021-05-16 NOTE — PHYSICAL EXAM
[General Appearance - Well Developed] : well developed [Normal Appearance] : normal appearance [Well Groomed] : well groomed [General Appearance - Well Nourished] : well nourished [General Appearance - In No Acute Distress] : no acute distress [Normal Conjunctiva] : the conjunctiva exhibited no abnormalities [No Oral Pallor] : no oral pallor [No Oral Cyanosis] : no oral cyanosis [Normal Jugular Venous V Waves Present] : normal jugular venous V waves present [No Jugular Venous Frye A Waves] : no jugular venous frye A waves [Respiration, Rhythm And Depth] : normal respiratory rhythm and effort [Exaggerated Use Of Accessory Muscles For Inspiration] : no accessory muscle use [Auscultation Breath Sounds / Voice Sounds] : lungs were clear to auscultation bilaterally [Heart Rate And Rhythm] : heart rate and rhythm were normal [Heart Sounds] : normal S1 and S2 [Murmurs] : no murmurs present [Edema] : no peripheral edema present [Abdomen Soft] : soft [Abdomen Tenderness] : non-tender [Abnormal Walk] : normal gait [Nail Clubbing] : no clubbing of the fingernails [Cyanosis, Localized] : no localized cyanosis [Skin Turgor] : normal skin turgor [] : no rash [Oriented To Time, Place, And Person] : oriented to person, place, and time [Impaired Insight] : insight and judgment were intact [Affect] : the affect was normal [Memory Recent] : recent memory was not impaired [FreeTextEntry1] : left knee in a brace

## 2021-05-16 NOTE — HISTORY OF PRESENT ILLNESS
[FreeTextEntry1] : 70 year old male with a PMH of CAD/MI s/p PCI with inferior and inferolateral scar, PAD, HTN, HLD, HFrEF, and BPH. Patient presents for follow up of NICM. He is s/p APC ablation with Dr. Cee 1/2020. He reports palpitations have resolved. Repeat TTE done  6/2020 with improved LVEF 41%. He states he is feeling well. He denies CP, SOB, exertional dyspnea, leg edema, lightheadedness, near syncope or syncope.BP mildly elevated today. He has no claudication but notes numbness and burning of the right thigh.  He denies back pain. He had right SFA stent in 2014.

## 2021-07-09 ENCOUNTER — APPOINTMENT (OUTPATIENT)
Dept: CARDIOLOGY | Facility: CLINIC | Age: 71
End: 2021-07-09
Payer: COMMERCIAL

## 2021-07-09 VITALS
WEIGHT: 182 LBS | SYSTOLIC BLOOD PRESSURE: 111 MMHG | OXYGEN SATURATION: 98 % | BODY MASS INDEX: 29.25 KG/M2 | TEMPERATURE: 98.3 F | DIASTOLIC BLOOD PRESSURE: 50 MMHG | HEIGHT: 66 IN | HEART RATE: 53 BPM

## 2021-07-09 VITALS — SYSTOLIC BLOOD PRESSURE: 120 MMHG | DIASTOLIC BLOOD PRESSURE: 70 MMHG

## 2021-07-09 PROCEDURE — 93000 ELECTROCARDIOGRAM COMPLETE: CPT

## 2021-07-09 PROCEDURE — 99214 OFFICE O/P EST MOD 30 MIN: CPT | Mod: 25

## 2021-07-09 PROCEDURE — 99072 ADDL SUPL MATRL&STAF TM PHE: CPT

## 2022-02-04 ENCOUNTER — NON-APPOINTMENT (OUTPATIENT)
Age: 72
End: 2022-02-04

## 2022-02-04 ENCOUNTER — APPOINTMENT (OUTPATIENT)
Dept: CARDIOLOGY | Facility: CLINIC | Age: 72
End: 2022-02-04
Payer: COMMERCIAL

## 2022-02-04 VITALS
WEIGHT: 182 LBS | OXYGEN SATURATION: 99 % | HEART RATE: 57 BPM | DIASTOLIC BLOOD PRESSURE: 64 MMHG | BODY MASS INDEX: 29.25 KG/M2 | SYSTOLIC BLOOD PRESSURE: 130 MMHG | HEIGHT: 66 IN | TEMPERATURE: 98.3 F

## 2022-02-04 PROCEDURE — 93000 ELECTROCARDIOGRAM COMPLETE: CPT

## 2022-02-04 PROCEDURE — 99215 OFFICE O/P EST HI 40 MIN: CPT

## 2022-02-04 RX ORDER — METFORMIN HYDROCHLORIDE 500 MG/1
500 TABLET, FILM COATED ORAL
Refills: 0 | Status: DISCONTINUED | COMMUNITY
Start: 2020-01-30 | End: 2022-02-04

## 2022-02-04 RX ORDER — OXYBUTYNIN CHLORIDE 15 MG/1
15 TABLET, EXTENDED RELEASE ORAL
Refills: 0 | Status: DISCONTINUED | COMMUNITY
Start: 2020-01-30 | End: 2022-02-04

## 2022-02-04 RX ORDER — TAMSULOSIN HYDROCHLORIDE 0.4 MG/1
0.4 CAPSULE ORAL
Qty: 90 | Refills: 0 | Status: DISCONTINUED | COMMUNITY
Start: 2020-01-30 | End: 2022-02-04

## 2022-07-01 NOTE — H&P PST ADULT - NSANTHOSAYNRD_GEN_A_CORE
Head, normocephalic, atraumatic, Face, Face within normal limits, Ears, External ears within normal limits No. LAURA screening performed.  STOP BANG Legend: 0-2 = LOW Risk; 3-4 = INTERMEDIATE Risk; 5-8 = HIGH Risk

## 2022-08-19 ENCOUNTER — APPOINTMENT (OUTPATIENT)
Dept: CARDIOLOGY | Facility: CLINIC | Age: 72
End: 2022-08-19

## 2022-08-19 ENCOUNTER — NON-APPOINTMENT (OUTPATIENT)
Age: 72
End: 2022-08-19

## 2022-08-19 VITALS
HEART RATE: 53 BPM | DIASTOLIC BLOOD PRESSURE: 70 MMHG | OXYGEN SATURATION: 98 % | TEMPERATURE: 98 F | SYSTOLIC BLOOD PRESSURE: 118 MMHG | WEIGHT: 189 LBS | BODY MASS INDEX: 30.37 KG/M2 | HEIGHT: 66 IN

## 2022-08-19 PROCEDURE — 93000 ELECTROCARDIOGRAM COMPLETE: CPT

## 2022-08-19 PROCEDURE — 99214 OFFICE O/P EST MOD 30 MIN: CPT | Mod: 25

## 2022-08-19 PROCEDURE — 93306 TTE W/DOPPLER COMPLETE: CPT

## 2022-10-24 ENCOUNTER — NON-APPOINTMENT (OUTPATIENT)
Age: 72
End: 2022-10-24

## 2023-02-15 ENCOUNTER — RX RENEWAL (OUTPATIENT)
Age: 73
End: 2023-02-15

## 2023-03-31 ENCOUNTER — APPOINTMENT (OUTPATIENT)
Dept: CARDIOLOGY | Facility: CLINIC | Age: 73
End: 2023-03-31
Payer: COMMERCIAL

## 2023-03-31 ENCOUNTER — OUTPATIENT (OUTPATIENT)
Dept: OUTPATIENT SERVICES | Facility: HOSPITAL | Age: 73
LOS: 1 days | End: 2023-03-31
Payer: COMMERCIAL

## 2023-03-31 ENCOUNTER — NON-APPOINTMENT (OUTPATIENT)
Age: 73
End: 2023-03-31

## 2023-03-31 ENCOUNTER — APPOINTMENT (OUTPATIENT)
Dept: CT IMAGING | Facility: CLINIC | Age: 73
End: 2023-03-31
Payer: COMMERCIAL

## 2023-03-31 VITALS
TEMPERATURE: 98.9 F | WEIGHT: 180 LBS | SYSTOLIC BLOOD PRESSURE: 124 MMHG | DIASTOLIC BLOOD PRESSURE: 72 MMHG | OXYGEN SATURATION: 98 % | HEIGHT: 60 IN | HEART RATE: 58 BPM | BODY MASS INDEX: 35.34 KG/M2

## 2023-03-31 DIAGNOSIS — I25.5 ISCHEMIC CARDIOMYOPATHY: ICD-10-CM

## 2023-03-31 DIAGNOSIS — I73.9 PERIPHERAL VASCULAR DISEASE, UNSPECIFIED: ICD-10-CM

## 2023-03-31 DIAGNOSIS — E11.9 TYPE 2 DIABETES MELLITUS W/OUT COMPLICATIONS: ICD-10-CM

## 2023-03-31 DIAGNOSIS — Z78.9 OTHER SPECIFIED HEALTH STATUS: Chronic | ICD-10-CM

## 2023-03-31 DIAGNOSIS — R10.30 LOWER ABDOMINAL PAIN, UNSPECIFIED: ICD-10-CM

## 2023-03-31 DIAGNOSIS — R10.31 RIGHT LOWER QUADRANT PAIN: ICD-10-CM

## 2023-03-31 PROCEDURE — 99214 OFFICE O/P EST MOD 30 MIN: CPT | Mod: 25

## 2023-03-31 PROCEDURE — 74178 CT ABD&PLV WO CNTR FLWD CNTR: CPT | Mod: 26

## 2023-03-31 PROCEDURE — 93000 ELECTROCARDIOGRAM COMPLETE: CPT

## 2023-03-31 PROCEDURE — 74178 CT ABD&PLV WO CNTR FLWD CNTR: CPT

## 2023-03-31 RX ORDER — TAMSULOSIN HYDROCHLORIDE 0.4 MG/1
0.4 CAPSULE ORAL DAILY
Refills: 0 | Status: ACTIVE | COMMUNITY

## 2023-04-03 ENCOUNTER — RX RENEWAL (OUTPATIENT)
Age: 73
End: 2023-04-03

## 2023-04-28 ENCOUNTER — RX RENEWAL (OUTPATIENT)
Age: 73
End: 2023-04-28

## 2023-06-05 NOTE — REVIEW OF SYSTEMS
[Weight Loss (___ Lbs)] : [unfilled] ~Ulb weight loss [Abdominal Pain] : abdominal pain [Negative] : Heme/Lymph [Fever] : no fever [FreeTextEntry7] : see HPI

## 2023-06-05 NOTE — CARDIOLOGY SUMMARY
[de-identified] : 3/31/2023:  Sinus  Bradycardia \par Right bundle branch block\par Inferioposterior infarct  \par Left axis -may be secondary to infarct \par Nonspecific T-abnormality. [de-identified] : 8/22/2022:  LVEF 36%, old inferior and inferolateral MI, mild MR [de-identified] : 1/5/2018:  carotid Doppler with mild bilateral atherosclerosis

## 2023-06-05 NOTE — PHYSICAL EXAM
[Well Developed] : well developed [Well Nourished] : well nourished [No Acute Distress] : no acute distress [Normal Conjunctiva] : normal conjunctiva [Normal Venous Pressure] : normal venous pressure [No Carotid Bruit] : no carotid bruit [Normal S1, S2] : normal S1, S2 [No Murmur] : no murmur [No Gallop] : no gallop [Clear Lung Fields] : clear lung fields [Good Air Entry] : good air entry [No Respiratory Distress] : no respiratory distress  [Soft] : abdomen soft [Non Tender] : non-tender [No Masses/organomegaly] : no masses/organomegaly [Normal Bowel Sounds] : normal bowel sounds [Normal Gait] : normal gait [No Edema] : no edema [No Cyanosis] : no cyanosis [No Varicosities] : no varicosities [No Rash] : no rash [No Skin Lesions] : no skin lesions [Moves all extremities] : moves all extremities [No Focal Deficits] : no focal deficits [Normal Speech] : normal speech [Alert and Oriented] : alert and oriented [Normal memory] : normal memory [de-identified] : lower right inguinal area soft with no swelling.

## 2023-06-05 NOTE — HISTORY OF PRESENT ILLNESS
[FreeTextEntry1] : 72 year old male with a PMH of CAD/MI s/p PCI with inferior and inferolateral scar, PAD s/p SFA stent in 2014, HTN, HLD, HFrEF, s/p APC ablation 1/2020 and BPH, who presents to the office for follow up of HFrEF. Reports feeling "good" from the heart perspective but has been noting transient right groin pain that can radiate up to right flank.He denies fever or dysuria.  He denies abdominal cramps but feels that it is difficult to get comfortable. Denies chest pain, SOB at rest, SUNG, palpitations, lightheadedness, dizziness, fatigue, syncope, near syncope and LE edema. Admits to drinking about 3 glasses of wine on days off from work and 1-2 glasses during the week. Denies smoking and illicit drug use. Results of recent labs and TTE reviewed with patient. TTE with modest reduction in LVEF and RV function compared to TTE done 1/2021. Labs from 2/2/2023 with LDL 50 and triglycerides 43, A1C of 6.6%. UA then was normal.

## 2023-09-22 NOTE — DISCHARGE NOTE PROVIDER - NSDCCPTREATMENT_GEN_ALL_CORE_FT
PRINCIPAL PROCEDURE  Procedure: Radiofrequency catheter ablation of heart using three dimensional electroanatomic mapping system  Findings and Treatment: - Bruising at the groin, sometimes extending down the leg, and/or a small lump under the skin at the groin access site is normal and will resolve within 2 – 3 weeks.   - Occasional skipped beats or palpitations that last for a few beats are common and generally resolve within 1-2 months.   - You make walk and take stairs at a regular pace.   - Do not perform any exercise more strenuous than walking for 1 week.   - Do not strain or lift heavy objects for 1 week.  - You may shower the day after the procedure.  - Do not soak in water (such as tub baths, hot tubs, swimming, etc.) for 1 week.   - You may resume all other activities the day after the procedure.  Call your doctor if:   - you notice bleeding, redness, drainage, swelling, increased tenderness or a hot sensation around the catheter insertion site.   - your temperature is greater than 100 degrees F for more than 24 hours.  - your rapid heart rhythm returns.  - you have any questions or concerns regarding the procedure.  If significant bleeding and/or a large lump (the size of a golf ball or bigger) occurs:  - Lie flat and apply continuous direct pressure just above the puncture site for at least 10 minutes  - If the issue resolves, notify your physician immediately.    - If the bleeding cannot be controlled, please seek immediate medical attention.  If you experience increased difficulty breathing or chest pain, or if you faint or have dizzy spells, please seek immediate medical attention.
Spine appears normal, movement of extremities grossly intact.

## 2023-09-24 ENCOUNTER — RX RENEWAL (OUTPATIENT)
Age: 73
End: 2023-09-24

## 2023-09-29 ENCOUNTER — NON-APPOINTMENT (OUTPATIENT)
Age: 73
End: 2023-09-29

## 2023-09-29 ENCOUNTER — APPOINTMENT (OUTPATIENT)
Dept: CARDIOLOGY | Facility: CLINIC | Age: 73
End: 2023-09-29
Payer: COMMERCIAL

## 2023-09-29 VITALS
SYSTOLIC BLOOD PRESSURE: 136 MMHG | TEMPERATURE: 97.7 F | OXYGEN SATURATION: 100 % | DIASTOLIC BLOOD PRESSURE: 74 MMHG | HEART RATE: 50 BPM | HEIGHT: 60 IN | BODY MASS INDEX: 35.53 KG/M2 | WEIGHT: 181 LBS

## 2023-09-29 PROCEDURE — 93000 ELECTROCARDIOGRAM COMPLETE: CPT

## 2023-09-29 PROCEDURE — 93306 TTE W/DOPPLER COMPLETE: CPT

## 2023-09-29 PROCEDURE — 99214 OFFICE O/P EST MOD 30 MIN: CPT | Mod: 25

## 2024-04-05 ENCOUNTER — APPOINTMENT (OUTPATIENT)
Dept: CARDIOLOGY | Facility: CLINIC | Age: 74
End: 2024-04-05
Payer: COMMERCIAL

## 2024-04-05 ENCOUNTER — NON-APPOINTMENT (OUTPATIENT)
Age: 74
End: 2024-04-05

## 2024-04-05 VITALS — HEART RATE: 56 BPM | OXYGEN SATURATION: 100 % | DIASTOLIC BLOOD PRESSURE: 70 MMHG | SYSTOLIC BLOOD PRESSURE: 136 MMHG

## 2024-04-05 VITALS — BODY MASS INDEX: 30.32 KG/M2 | HEIGHT: 65 IN | WEIGHT: 182 LBS

## 2024-04-05 DIAGNOSIS — I10 ESSENTIAL (PRIMARY) HYPERTENSION: ICD-10-CM

## 2024-04-05 DIAGNOSIS — I42.9 CARDIOMYOPATHY, UNSPECIFIED: ICD-10-CM

## 2024-04-05 DIAGNOSIS — I65.23 OCCLUSION AND STENOSIS OF BILATERAL CAROTID ARTERIES: ICD-10-CM

## 2024-04-05 DIAGNOSIS — I25.10 ATHEROSCLEROTIC HEART DISEASE OF NATIVE CORONARY ARTERY W/OUT ANGINA PECTORIS: ICD-10-CM

## 2024-04-05 DIAGNOSIS — E78.5 HYPERLIPIDEMIA, UNSPECIFIED: ICD-10-CM

## 2024-04-05 DIAGNOSIS — K40.91 UNILATERAL INGUINAL HERNIA, W/OUT OBSTRUCTION OR GANGRENE, RECURRENT: ICD-10-CM

## 2024-04-05 PROCEDURE — 93000 ELECTROCARDIOGRAM COMPLETE: CPT

## 2024-04-05 PROCEDURE — 99214 OFFICE O/P EST MOD 30 MIN: CPT | Mod: 25

## 2024-04-05 RX ORDER — SACUBITRIL AND VALSARTAN 97; 103 MG/1; MG/1
97-103 TABLET, FILM COATED ORAL
Qty: 180 | Refills: 3 | Status: ACTIVE | COMMUNITY
Start: 2019-05-17 | End: 1900-01-01

## 2024-04-05 RX ORDER — CARVEDILOL PHOSPHATE 20 MG/1
20 CAPSULE, EXTENDED RELEASE ORAL DAILY
Qty: 90 | Refills: 3 | Status: ACTIVE | COMMUNITY
Start: 2019-11-22 | End: 1900-01-01

## 2024-04-05 RX ORDER — SPIRONOLACTONE 25 MG/1
25 TABLET ORAL
Qty: 45 | Refills: 3 | Status: ACTIVE | COMMUNITY
Start: 2020-03-27 | End: 1900-01-01

## 2024-05-07 NOTE — HISTORY OF PRESENT ILLNESS
[FreeTextEntry1] : 73 year old male with a PMH of CAD/MI s/p PCI with inferior and inferolateral scar, PAD s/p SFA stent in 2014, HTN, HLD, HFrEF, s/p APC ablation 1/2020 and BPH, who presents to the office for follow up of HFrEF. Reports feeling "good" from the heart perspective but has been noting transient right groin pain that can radiate up to right flank.He denies fever or dysuria.  He denies abdominal cramps but feels that it is difficult to get comfortable. Denies chest pain, SOB at rest, SUNG, palpitations, lightheadedness, dizziness, fatigue, syncope, near syncope and LE edema. Admits to drinking about 3 glasses of wine on days off from work and 1-2 glasses during the week. He no longer drinks stronger spirits.   Denies smoking and illicit drug use. Results of recent labs and TTE reviewed with patient. TTE done 9/2023 with LVEF 43%.  Labs from 2/2/2023 with LDL 50 and triglycerides 43, A1C of 6.6%. UA then was normal.  He was diagnosed with a small inguinal hernia that is being monitored.

## 2024-05-07 NOTE — CARDIOLOGY SUMMARY
[de-identified] : 4/5/2024:   Normal sinus rhythm Right Bundle Branch Block with Left anterior hemiblock [de-identified] : 9/29/2023:  LVEF 43%, mild TR, prior inferoseptal and inferolateral MI,  [de-identified] : 1/5/2018:  mild bilateral atherosclerosis

## 2024-05-07 NOTE — PHYSICAL EXAM
[Well Developed] : well developed [Well Nourished] : well nourished [No Acute Distress] : no acute distress [Normal Conjunctiva] : normal conjunctiva [Normal Venous Pressure] : normal venous pressure [No Carotid Bruit] : no carotid bruit [Normal S1, S2] : normal S1, S2 [No Murmur] : no murmur [No Gallop] : no gallop [Clear Lung Fields] : clear lung fields [Good Air Entry] : good air entry [No Respiratory Distress] : no respiratory distress  [Soft] : abdomen soft [Non Tender] : non-tender [No Masses/organomegaly] : no masses/organomegaly [Normal Bowel Sounds] : normal bowel sounds [Normal Gait] : normal gait [No Edema] : no edema [No Cyanosis] : no cyanosis [No Varicosities] : no varicosities [No Rash] : no rash [No Skin Lesions] : no skin lesions [Moves all extremities] : moves all extremities [No Focal Deficits] : no focal deficits [Normal Speech] : normal speech [Alert and Oriented] : alert and oriented [Normal memory] : normal memory [de-identified] : lower right inguinal area soft with no swelling.

## 2024-05-07 NOTE — DISCUSSION/SUMMARY
[FreeTextEntry1] : 73 year old male with a PMH of CAD/MI s/p PCI with inferior and inferolateral scar, PAD s/p SFA stent in 2014, HTN, HLD, HFrEF, s/p APC ablation 1/2020 and BPH. Admits to drinking about 3 glasses of wine on days off from work and 1-2 glasses during the week. He no longer drinks stronger spirits.   TTE done 9/2023 with LVEF 43%.  He has a hernia being monitored and his cardiac status is stable.  He will continue his current medications and follow up in 6 months with a repeat carotid Doppler to reassess known carotid disease.

## 2024-05-07 NOTE — REVIEW OF SYSTEMS
[Weight Loss (___ Lbs)] : [unfilled] ~Ulb weight loss [Abdominal Pain] : abdominal pain [Negative] : Heme/Lymph [Fever] : no fever [FreeTextEntry7] : see HPI - abdominal pain from hernia

## 2024-05-16 ENCOUNTER — APPOINTMENT (OUTPATIENT)
Dept: ULTRASOUND IMAGING | Facility: CLINIC | Age: 74
End: 2024-05-16
Payer: COMMERCIAL

## 2024-05-16 ENCOUNTER — APPOINTMENT (OUTPATIENT)
Dept: SURGERY | Facility: CLINIC | Age: 74
End: 2024-05-16
Payer: COMMERCIAL

## 2024-05-16 ENCOUNTER — OUTPATIENT (OUTPATIENT)
Dept: OUTPATIENT SERVICES | Facility: HOSPITAL | Age: 74
LOS: 1 days | End: 2024-05-16
Payer: COMMERCIAL

## 2024-05-16 VITALS
HEIGHT: 67 IN | BODY MASS INDEX: 27.94 KG/M2 | TEMPERATURE: 98 F | OXYGEN SATURATION: 100 % | DIASTOLIC BLOOD PRESSURE: 80 MMHG | RESPIRATION RATE: 16 BRPM | WEIGHT: 178.03 LBS | SYSTOLIC BLOOD PRESSURE: 176 MMHG | HEART RATE: 64 BPM

## 2024-05-16 VITALS — SYSTOLIC BLOOD PRESSURE: 165 MMHG | DIASTOLIC BLOOD PRESSURE: 85 MMHG

## 2024-05-16 DIAGNOSIS — R10.31 RIGHT LOWER QUADRANT PAIN: ICD-10-CM

## 2024-05-16 DIAGNOSIS — Z78.9 OTHER SPECIFIED HEALTH STATUS: ICD-10-CM

## 2024-05-16 DIAGNOSIS — Z78.9 OTHER SPECIFIED HEALTH STATUS: Chronic | ICD-10-CM

## 2024-05-16 PROCEDURE — 76705 ECHO EXAM OF ABDOMEN: CPT

## 2024-05-16 PROCEDURE — 76705 ECHO EXAM OF ABDOMEN: CPT | Mod: 26

## 2024-05-16 PROCEDURE — 99204 OFFICE O/P NEW MOD 45 MIN: CPT

## 2024-05-18 NOTE — HISTORY OF PRESENT ILLNESS
[de-identified] : Mr. HENDRIX is a 74 year old man with right groin pain, who presents for evaluation for right inguinal hernia. He has known umbilical hernia - denies pain at umbilicus and states that this has been unchanged for many years. Reports intermittent right groin pain, especially at end of day and with activity. No appreciable bulging. Denies fever/chills/nausea/emesis or changes in bowel or bladder habits. Tolerating diet. Normal bowel movements.   denies smoking

## 2024-05-18 NOTE — CONSULT LETTER
[Dear  ___] : Dear  [unfilled], [Courtesy Letter:] : I had the pleasure of seeing your patient, [unfilled], in my office today. [Please see my note below.] : Please see my note below. [Consult Closing:] : Thank you very much for allowing me to participate in the care of this patient.  If you have any questions, please do not hesitate to contact me. [Sincerely,] : Sincerely, [FreeTextEntry3] : Eric Gallego MD, FACS Director of Bariatric Surgery [facility]  Assistant Professor of Surgery  [employer] [facility] [place]

## 2024-05-18 NOTE — ASSESSMENT
[FreeTextEntry1] : Mr. HENDRIX is a 74 year old man with asymptomatic umbilical hernia and right groin pain - no appreciable right groin hernia on exam. I recommend ultrasound to evaluate for presence of right groin hernia.

## 2024-05-18 NOTE — PHYSICAL EXAM
[JVD] : no jugular venous distention  [No Rash or Lesion] : No rash or lesion [Alert] : alert [Oriented to Person] : oriented to person [Oriented to Place] : oriented to place [Oriented to Time] : oriented to time [Calm] : calm [de-identified] : No acute distress [de-identified] : No respiratory distress [de-identified] : Regular rate [de-identified] : soft, nontender. no rebound or guarding. palpable umbilcial hernia - defect ~1.5cm, nontender, reducible. no appreciable hernia palpable in either groin. point tenderness in right lateral groin at site of palpable lymph node.  [de-identified] : normal range of motion

## 2024-05-23 ENCOUNTER — APPOINTMENT (OUTPATIENT)
Dept: SURGERY | Facility: CLINIC | Age: 74
End: 2024-05-23
Payer: COMMERCIAL

## 2024-05-23 VITALS
HEIGHT: 67 IN | DIASTOLIC BLOOD PRESSURE: 89 MMHG | OXYGEN SATURATION: 99 % | RESPIRATION RATE: 16 BRPM | TEMPERATURE: 98.3 F | SYSTOLIC BLOOD PRESSURE: 176 MMHG | WEIGHT: 178 LBS | HEART RATE: 66 BPM | BODY MASS INDEX: 27.94 KG/M2

## 2024-05-23 VITALS — DIASTOLIC BLOOD PRESSURE: 74 MMHG | SYSTOLIC BLOOD PRESSURE: 163 MMHG

## 2024-05-23 DIAGNOSIS — K40.90 UNILATERAL INGUINAL HERNIA, W/OUT OBSTRUCTION OR GANGRENE, NOT SPECIFIED AS RECURRENT: ICD-10-CM

## 2024-05-23 DIAGNOSIS — K42.9 UMBILICAL HERNIA W/OUT OBSTRUCTION OR GANGRENE: ICD-10-CM

## 2024-05-23 PROCEDURE — 99214 OFFICE O/P EST MOD 30 MIN: CPT

## 2024-05-23 NOTE — PLAN
[FreeTextEntry1] : Plan for robotic right inguinal hernia repair with mesh and umbilical hernia repair.

## 2024-05-23 NOTE — ASSESSMENT
[FreeTextEntry1] : Mr. HENDRIX is a 74 year old man with right inguinal hernia and umbilical hernia. We discussed the options of robotic/laparoscopic repair, open repair, and nonoperative management. The risks/benefits and alternatives were discussed at length and all questions were answered. We discussed the risks and benefits of the use of mesh. The patient appears to understand and wishes to proceed with robotic right inguinal hernia repair with mesh and umbilical hernia repair.

## 2024-05-23 NOTE — HISTORY OF PRESENT ILLNESS
[de-identified] : Mr. HENDRIX is a 74 year old man with right groin pain, who returns today after groin ultrasound which displays right inguinal hernia. He has known umbilical hernia - denies pain at umbilicus and states that this has been unchanged for many years. Reports intermittent right groin pain, especially at end of day and with activity. Denies fever/chills/nausea/emesis or changes in bowel or bladder habits. Tolerating diet. Normal bowel movements.   denies smoking

## 2024-07-23 NOTE — H&P PST ADULT - CONSTITUTIONAL DETAILS
[Time Spent: ___ minutes] : I have spent [unfilled] minutes of time on the encounter. well-groomed/well-developed

## 2024-11-01 ENCOUNTER — APPOINTMENT (OUTPATIENT)
Dept: CARDIOLOGY | Facility: CLINIC | Age: 74
End: 2024-11-01
Payer: COMMERCIAL

## 2024-11-01 ENCOUNTER — NON-APPOINTMENT (OUTPATIENT)
Age: 74
End: 2024-11-01

## 2024-11-01 ENCOUNTER — APPOINTMENT (OUTPATIENT)
Dept: CARDIOLOGY | Facility: CLINIC | Age: 74
End: 2024-11-01

## 2024-11-01 VITALS — SYSTOLIC BLOOD PRESSURE: 120 MMHG | DIASTOLIC BLOOD PRESSURE: 80 MMHG

## 2024-11-01 VITALS
HEIGHT: 67 IN | BODY MASS INDEX: 27.94 KG/M2 | HEART RATE: 70 BPM | OXYGEN SATURATION: 100 % | SYSTOLIC BLOOD PRESSURE: 168 MMHG | WEIGHT: 178 LBS | DIASTOLIC BLOOD PRESSURE: 80 MMHG

## 2024-11-01 DIAGNOSIS — Z98.890 OTHER SPECIFIED POSTPROCEDURAL STATES: ICD-10-CM

## 2024-11-01 DIAGNOSIS — I25.5 ISCHEMIC CARDIOMYOPATHY: ICD-10-CM

## 2024-11-01 DIAGNOSIS — I10 ESSENTIAL (PRIMARY) HYPERTENSION: ICD-10-CM

## 2024-11-01 DIAGNOSIS — N40.1 BENIGN PROSTATIC HYPERPLASIA WITH LOWER URINARY TRACT SYMPMS: ICD-10-CM

## 2024-11-01 DIAGNOSIS — Z86.79 OTHER SPECIFIED POSTPROCEDURAL STATES: ICD-10-CM

## 2024-11-01 DIAGNOSIS — R39.11 BENIGN PROSTATIC HYPERPLASIA WITH LOWER URINARY TRACT SYMPMS: ICD-10-CM

## 2024-11-01 DIAGNOSIS — I25.10 ATHEROSCLEROTIC HEART DISEASE OF NATIVE CORONARY ARTERY W/OUT ANGINA PECTORIS: ICD-10-CM

## 2024-11-01 DIAGNOSIS — E78.5 HYPERLIPIDEMIA, UNSPECIFIED: ICD-10-CM

## 2024-11-01 PROCEDURE — 99213 OFFICE O/P EST LOW 20 MIN: CPT | Mod: 25

## 2024-11-01 PROCEDURE — 93880 EXTRACRANIAL BILAT STUDY: CPT

## 2024-11-01 PROCEDURE — 93000 ELECTROCARDIOGRAM COMPLETE: CPT

## 2024-11-15 ENCOUNTER — APPOINTMENT (OUTPATIENT)
Dept: CARDIOLOGY | Facility: CLINIC | Age: 74
End: 2024-11-15

## 2025-06-06 ENCOUNTER — APPOINTMENT (OUTPATIENT)
Dept: CARDIOLOGY | Facility: CLINIC | Age: 75
End: 2025-06-06

## 2025-06-09 ENCOUNTER — RX RENEWAL (OUTPATIENT)
Age: 75
End: 2025-06-09

## 2025-06-16 ENCOUNTER — APPOINTMENT (OUTPATIENT)
Dept: CARDIOLOGY | Facility: CLINIC | Age: 75
End: 2025-06-16

## 2025-06-27 ENCOUNTER — APPOINTMENT (OUTPATIENT)
Dept: CARDIOLOGY | Facility: CLINIC | Age: 75
End: 2025-06-27

## 2025-06-27 ENCOUNTER — APPOINTMENT (OUTPATIENT)
Dept: CARDIOLOGY | Facility: CLINIC | Age: 75
End: 2025-06-27
Payer: COMMERCIAL

## 2025-06-27 VITALS
HEIGHT: 67 IN | HEART RATE: 79 BPM | SYSTOLIC BLOOD PRESSURE: 126 MMHG | DIASTOLIC BLOOD PRESSURE: 56 MMHG | BODY MASS INDEX: 28.88 KG/M2 | WEIGHT: 184 LBS | OXYGEN SATURATION: 98 %

## 2025-06-27 PROCEDURE — 93306 TTE W/DOPPLER COMPLETE: CPT

## 2025-06-27 PROCEDURE — 93000 ELECTROCARDIOGRAM COMPLETE: CPT

## 2025-06-27 PROCEDURE — 99213 OFFICE O/P EST LOW 20 MIN: CPT | Mod: 25

## 2025-06-27 RX ORDER — FINASTERIDE 5 MG/1
5 TABLET, FILM COATED ORAL DAILY
Qty: 90 | Refills: 3 | Status: ACTIVE | COMMUNITY
Start: 2025-06-27 | End: 1900-01-01